# Patient Record
Sex: MALE | Employment: FULL TIME | URBAN - METROPOLITAN AREA
[De-identification: names, ages, dates, MRNs, and addresses within clinical notes are randomized per-mention and may not be internally consistent; named-entity substitution may affect disease eponyms.]

---

## 2017-02-27 ENCOUNTER — OFFICE VISIT (OUTPATIENT)
Dept: FAMILY MEDICINE | Facility: OTHER | Age: 42
End: 2017-02-27
Attending: FAMILY MEDICINE
Payer: MEDICARE

## 2017-02-27 VITALS
DIASTOLIC BLOOD PRESSURE: 78 MMHG | WEIGHT: 189 LBS | TEMPERATURE: 98.5 F | HEART RATE: 89 BPM | BODY MASS INDEX: 26.46 KG/M2 | SYSTOLIC BLOOD PRESSURE: 120 MMHG | HEIGHT: 71 IN | OXYGEN SATURATION: 91 %

## 2017-02-27 DIAGNOSIS — J18.9 PNEUMONIA OF RIGHT LUNG DUE TO INFECTIOUS ORGANISM, UNSPECIFIED PART OF LUNG: Primary | ICD-10-CM

## 2017-02-27 DIAGNOSIS — R05.9 COUGH: ICD-10-CM

## 2017-02-27 LAB
BASOPHILS # BLD AUTO: 0 10E9/L (ref 0–0.2)
BASOPHILS NFR BLD AUTO: 0.2 %
DIFFERENTIAL METHOD BLD: NORMAL
EOSINOPHIL # BLD AUTO: 0.1 10E9/L (ref 0–0.7)
EOSINOPHIL NFR BLD AUTO: 0.6 %
ERYTHROCYTE [DISTWIDTH] IN BLOOD BY AUTOMATED COUNT: 12.5 % (ref 10–15)
HCT VFR BLD AUTO: 43.3 % (ref 40–53)
HGB BLD-MCNC: 14.3 G/DL (ref 13.3–17.7)
LYMPHOCYTES # BLD AUTO: 1.8 10E9/L (ref 0.8–5.3)
LYMPHOCYTES NFR BLD AUTO: 22.3 %
MCH RBC QN AUTO: 29.5 PG (ref 26.5–33)
MCHC RBC AUTO-ENTMCNC: 33 G/DL (ref 31.5–36.5)
MCV RBC AUTO: 90 FL (ref 78–100)
MONOCYTES # BLD AUTO: 0.7 10E9/L (ref 0–1.3)
MONOCYTES NFR BLD AUTO: 8 %
NEUTROPHILS # BLD AUTO: 5.7 10E9/L (ref 1.6–8.3)
NEUTROPHILS NFR BLD AUTO: 68.9 %
PLATELET # BLD AUTO: 343 10E9/L (ref 150–450)
RBC # BLD AUTO: 4.84 10E12/L (ref 4.4–5.9)
WBC # BLD AUTO: 8.3 10E9/L (ref 4–11)

## 2017-02-27 PROCEDURE — 99214 OFFICE O/P EST MOD 30 MIN: CPT | Performed by: FAMILY MEDICINE

## 2017-02-27 PROCEDURE — 96372 THER/PROPH/DIAG INJ SC/IM: CPT | Performed by: FAMILY MEDICINE

## 2017-02-27 PROCEDURE — 85025 COMPLETE CBC W/AUTO DIFF WBC: CPT | Performed by: FAMILY MEDICINE

## 2017-02-27 PROCEDURE — 71020 ZZHC CHEST TWO VIEWS, FRONT/LAT: CPT | Mod: TC

## 2017-02-27 PROCEDURE — 36415 COLL VENOUS BLD VENIPUNCTURE: CPT | Performed by: FAMILY MEDICINE

## 2017-02-27 PROCEDURE — 99212 OFFICE O/P EST SF 10 MIN: CPT | Mod: 25

## 2017-02-27 RX ORDER — CEFTRIAXONE 1 G/1
1000 INJECTION, POWDER, FOR SOLUTION INTRAMUSCULAR; INTRAVENOUS ONCE
Qty: 10 ML | Refills: 0 | OUTPATIENT
Start: 2017-02-27 | End: 2017-02-27

## 2017-02-27 RX ORDER — AZITHROMYCIN 250 MG/1
TABLET, FILM COATED ORAL
Qty: 6 TABLET | Refills: 0 | Status: SHIPPED | OUTPATIENT
Start: 2017-02-27 | End: 2017-03-03

## 2017-02-27 NOTE — NURSING NOTE
"Chief Complaint   Patient presents with     URI     patient has had this for a couple weeks now and tried OTC and symptoms have not improved        Initial /78 (BP Location: Left arm, Cuff Size: Adult Large)  Pulse 89  Temp 98.5  F (36.9  C) (Tympanic)  Ht 5' 11\" (1.803 m)  Wt 189 lb (85.7 kg)  SpO2 91%  BMI 26.36 kg/m2 Estimated body mass index is 26.36 kg/(m^2) as calculated from the following:    Height as of this encounter: 5' 11\" (1.803 m).    Weight as of this encounter: 189 lb (85.7 kg).  Medication Reconciliation: linette James      "

## 2017-02-27 NOTE — PATIENT INSTRUCTIONS
Kavon has pneumonia on the right side.  We gave him an injection of antibiotics today in the clinic, and sent him home with azithromycin (Z-pack).  The combination of the Z-pack and the shot (Rocephin) work well for pneumonia.  You can still use cough medication as needed.  Discharge Instructions for Pneumonia  You have been diagnosed with pneumonia, a serious lung infection. Most cases of pneumonia are caused by bacteria. Pneumonia most often occurs in older adults, young children, and people with chronic health problems.  Home care    Take your medication exactly as directed. Don t skip doses. Continue taking your antibiotics as directed until they are all gone -- even if you start to feel better. This will prevent the pneumonia from coming back.    Drink at least 8 glasses of water daily, unless directed otherwise. This helps to loosen and thin secretions so that you can cough them up.    Use a cool-mist humidifier in your bedroom. Be sure to clean the humidifier daily.    Coughing up mucus is normal. Don t use medications to suppress your cough unless your cough is dry, painful, or interferes with your sleep. You may use an expectorant if ordered by your doctor.    Warm compresses or a heating pad on the lowest setting can be used to relieve chest discomfort. Use several times a day for 15 to 20 minutes at a time. (To prevent injuring your skin, be sure the temperature of the compress or heating pad is warm, not hot.)    Get plenty of rest until your fever, shortness of breath, and chest pain go away.    Plan to get a flu shot every year.    Ask your doctor about pneumonia vaccinations.     Follow-up care  Make a follow-up appointment as directed by our staff.     When to seek medical care  Call 911 right away if you have any of the following:    Chest pain    Trouble breathing    Blue lips or fingernails  Otherwise, call your doctor if you have any of the following:    Fever above 101.5 F  (38.6 C)    Yellow,  green, bloody, or smelly sputum    More than normal mucus production    Vomiting     9800-5779 [employer]. [address] [place] [address], [place] [address]. All rights reserved. This information is not intended as a substitute for professional medical care. Always follow your healthcare professional's instructions.

## 2017-02-27 NOTE — MR AVS SNAPSHOT
After Visit Summary   2/27/2017    Hernando Irizarry    MRN: [id number]           Patient Information     Date Of Birth          1975        Visit Information        Provider Department      2/27/2017 10:00 AM Bobbi Alexandra MD [facility]        Today's Diagnoses     Pneumonia of right lung due to infectious organism, unspecified part of lung    -  1    Cough          Care Instructions    Kavon has pneumonia on the right side.  We gave him an injection of antibiotics today in the clinic, and sent him home with azithromycin (Z-pack).  The combination of the Z-pack and the shot (Rocephin) work well for pneumonia.  You can still use cough medication as needed.  Discharge Instructions for Pneumonia  You have been diagnosed with pneumonia, a serious lung infection. Most cases of pneumonia are caused by bacteria. Pneumonia most often occurs in older adults, young children, and people with chronic health problems.  Home care    Take your medication exactly as directed. Don t skip doses. Continue taking your antibiotics as directed until they are all gone -- even if you start to feel better. This will prevent the pneumonia from coming back.    Drink at least 8 glasses of water daily, unless directed otherwise. This helps to loosen and thin secretions so that you can cough them up.    Use a cool-mist humidifier in your bedroom. Be sure to clean the humidifier daily.    Coughing up mucus is normal. Don t use medications to suppress your cough unless your cough is dry, painful, or interferes with your sleep. You may use an expectorant if ordered by your doctor.    Warm compresses or a heating pad on the lowest setting can be used to relieve chest discomfort. Use several times a day for 15 to 20 minutes at a time. (To prevent injuring your skin, be sure the temperature of the compress or heating pad is warm, not hot.)    Get plenty of rest until your fever, shortness of breath, and chest pain go  "away.    Plan to get a flu shot every year.    Ask your doctor about pneumonia vaccinations.     Follow-up care  Make a follow-up appointment as directed by our staff.     When to seek medical care  Call 911 right away if you have any of the following:    Chest pain    Trouble breathing    Blue lips or fingernails  Otherwise, call your doctor if you have any of the following:    Fever above 101.5 F  (38.6 C)    Yellow, green, bloody, or smelly sputum    More than normal mucus production    Vomiting     4786-5982 [employer]. [address] [place] [address]. All rights reserved. This information is not intended as a substitute for professional medical care. Always follow your healthcare professional's instructions.              Follow-ups after your visit        Follow-up notes from your care team     Return if symptoms worsen or fail to improve.      Who to contact     If you have questions or need follow up information about today's clinic visit or your schedule please contact [facility] directly at [phone].  Normal or non-critical lab and imaging results will be communicated to you by [employer], letter or phone within 4 business days after the clinic has received the results. If you do not hear from us within 7 days, please contact the clinic through [employer] or phone. If you have a critical or abnormal lab result, we will notify you by phone as soon as possible.  Submit refill requests through [employer] or call your pharmacy and they will forward the refill request to us. Please allow 3 business days for your refill to be completed.          Additional Information About Your Visit        [employer] Information     [employer] lets you send messages to your doctor, view your test results, renew your prescriptions, schedule appointments and more. To sign up, go to [email or web address] . Click on \"Log in\" on the left side of the screen, which will take you to the Welcome page. Then " "click on \"Sign up Now\" on the right side of the page.     You will be asked to enter the access code listed below, as well as some personal information. Please follow the directions to create your username and password.     Your access code is: [email or web address]  Expires: 2017 10:07 AM     Your access code will  in 90 days. If you need help or a new code, please call your [facility] clinic or [phone].        Care EveryWhere ID     This is your Care EveryWhere ID. This could be used by other organizations to access your [facility] medical records  [id number]        Your Vitals Were     Pulse Temperature Height Pulse Oximetry BMI (Body Mass Index)       89 98.5  F (36.9  C) (Tympanic) 5' 11\" (1.803 m) 91% 26.36 kg/m2        Blood Pressure from Last 3 Encounters:   17 120/78   09/18/15 138/84   14 124/72    Weight from Last 3 Encounters:   17 189 lb (85.7 kg)   09/18/15 195 lb (88.5 kg)   14 188 lb (85.3 kg)              We Performed the Following     CBC with platelets and differential     CEFTRIAXONE NA INJ /250MG     INJECTION INTRAMUSCULAR OR SUB-Q     XR CHEST 2 VW (Clinic Performed)          Today's Medication Changes          These changes are accurate as of: 17 12:52 PM.  If you have any questions, ask your nurse or doctor.               Start taking these medicines.        Dose/Directions    azithromycin 250 MG tablet   Commonly known as:  ZITHROMAX   Used for:  Pneumonia of right lung due to infectious organism, unspecified part of lung   Started by:  Bobbi Alexandra MD        Two tablets first day, then one tablet daily for four days.   Quantity:  6 tablet   Refills:  0       cefTRIAXone 1 GM vial   Commonly known as:  ROCEPHIN   Used for:  Pneumonia of right lung due to infectious organism, unspecified part of lung   Started by:  Bobbi Alexandra MD        Dose:  1000 mg   Inject 1 g (1,000 mg) into the muscle once for 1 dose   Quantity:  10 mL   Refills:  0    "         Where to get your medicines      These medications were sent to [id number] - [place], [place] - [address]  [address] [facility] [address]  [address] [place] [address]     Phone:  [phone]     azithromycin 250 MG tablet         Some of these will need a paper prescription and others can be bought over the counter.  Ask your nurse if you have questions.     You don't need a prescription for these medications     cefTRIAXone 1 GM vial                Primary Care Provider    None       No address on file        Thank you!     Thank you for choosing [facility]  for your care. Our goal is always to provide you with excellent care. Hearing back from our patients is one way we can continue to improve our services. Please take a few minutes to complete the written survey that you may receive in the mail after your visit with us. Thank you!             Your Updated Medication List - Protect others around you: Learn how to safely use, store and throw away your medicines at www.disposemymeds.org.          This list is accurate as of: 2/27/17 12:52 PM.  Always use your most recent med list.                   Brand Name Dispense Instructions for use    azithromycin 250 MG tablet    ZITHROMAX    6 tablet    Two tablets first day, then one tablet daily for four days.       cefTRIAXone 1 GM vial    ROCEPHIN    10 mL    Inject 1 g (1,000 mg) into the muscle once for 1 dose       cetirizine 10 MG tablet    zyrTEC    30 tablet    Take 1 tablet (10 mg) by mouth every evening       fluticasone 50 MCG/ACT spray    FLONASE    1 Package    Spray 2 sprays into both nostrils daily       guaiFENesin-dextromethorphan 100-10 MG/5ML syrup    ROBITUSSIN DM    118 mL    Take 5 mLs by mouth every 4 hours as needed for cough       ibuprofen 800 MG tablet    ADVIL/MOTRIN    60 tablet    Take 1 tablet (800 mg) by mouth every 8 hours as needed for moderate pain       phenol 1.4 % Liqd spray     CHLORASEPTIC    118 mL    Take 1 spray (1 mL) by mouth every hour as needed for sore throat       pseudoePHEDrine 120 MG 12 hr tablet    SUDAFED    28 tablet    Take 1 tablet (120 mg) by mouth every 12 hours

## 2017-02-27 NOTE — PROGRESS NOTES
SUBJECTIVE:                                                    Hernando Irizarry is a 41 year old male who presents to clinic today for the following health issues:    Acute Illness   Acute illness concerns: cough  Onset: over 2 weeks, worsening    Fever: no    Chills/Sweats: YES    Headache (location?): no    Sinus Pressure:no    Conjunctivitis:  no    Ear Pain: no    Rhinorrhea: YES    Congestion: YES    Sore Throat: no     Cough: YES-non-productive    Wheeze: no    Decreased Appetite: no    Nausea: no    Vomiting: no    Diarrhea:  no    Dysuria/Freq.: no    Fatigue/Achiness: no    Sick/Strep Exposure: no     Therapies Tried and outcome: Cough medication, not helpful        Problem list and histories reviewed & adjusted, as indicated.  Additional history: as documented    There is no problem list on file for this patient.    No past surgical history on file.    Social History   Substance Use Topics     Smoking status: Never Smoker     Smokeless tobacco: Never Used     Alcohol use No     No family history on file.      Current Outpatient Prescriptions   Medication Sig Dispense Refill     cefTRIAXone (ROCEPHIN) 1 GM vial Inject 1 g (1,000 mg) into the muscle once for 1 dose 10 mL 0     azithromycin (ZITHROMAX) 250 MG tablet Two tablets first day, then one tablet daily for four days. 6 tablet 0     guaiFENesin-dextromethorphan (ROBITUSSIN DM) 100-10 MG/5ML syrup Take 5 mLs by mouth every 4 hours as needed for cough 118 mL 0     pseudoePHEDrine (SUDAFED) 120 MG 12 hr tablet Take 1 tablet (120 mg) by mouth every 12 hours 28 tablet 0     phenol (CHLORASEPTIC) 1.4 % LIQD Take 1 spray (1 mL) by mouth every hour as needed for sore throat 118 mL 0     ibuprofen (ADVIL,MOTRIN) 800 MG tablet Take 1 tablet (800 mg) by mouth every 8 hours as needed for moderate pain 60 tablet 1     cetirizine (ZYRTEC) 10 MG tablet Take 1 tablet (10 mg) by mouth every evening 30 tablet 1     fluticasone (FLONASE) 50 MCG/ACT nasal spray Spray 2  "sprays into both nostrils daily 1 Package 0     Allergies   Allergen Reactions     Bee Venom      Honey Bee Venom Protein (Honey Bee).       ROS:  Constitutional, HEENT, cardiovascular, pulmonary, gi and gu systems are negative, except as otherwise noted.    OBJECTIVE:                                                    /78 (BP Location: Left arm, Cuff Size: Adult Large)  Pulse 89  Temp 98.5  F (36.9  C) (Tympanic)  Ht 5' 11\" (1.803 m)  Wt 189 lb (85.7 kg)  SpO2 91%  BMI 26.36 kg/m2  Body mass index is 26.36 kg/(m^2).  GENERAL: alert and no distress  EYES: Eyes grossly normal to inspection, PERRL and conjunctivae and sclerae normal  HENT: ear canals and TM's normal, nose and mouth without ulcers or lesions  NECK: no adenopathy  RESP: decreased breath sounds R lower posterior  CV: regular rate and rhythm, normal S1 S2, no S3 or S4, no murmur, click or rub, no peripheral edema and peripheral pulses strong    Diagnostic Test Results:  Results for orders placed or performed in visit on 02/27/17 (from the past 24 hour(s))   CBC with platelets and differential   Result Value Ref Range    WBC 8.3 4.0 - 11.0 10e9/L    RBC Count 4.84 4.4 - 5.9 10e12/L    Hemoglobin 14.3 13.3 - 17.7 g/dL    Hematocrit 43.3 40.0 - 53.0 %    MCV 90 78 - 100 fl    MCH 29.5 26.5 - 33.0 pg    MCHC 33.0 31.5 - 36.5 g/dL    RDW 12.5 10.0 - 15.0 %    Platelet Count 343 150 - 450 10e9/L    Diff Method Automated Method     % Neutrophils 68.9 %    % Lymphocytes 22.3 %    % Monocytes 8.0 %    % Eosinophils 0.6 %    % Basophils 0.2 %    Absolute Neutrophil 5.7 1.6 - 8.3 10e9/L    Absolute Lymphocytes 1.8 0.8 - 5.3 10e9/L    Absolute Monocytes 0.7 0.0 - 1.3 10e9/L    Absolute Eosinophils 0.1 0.0 - 0.7 10e9/L    Absolute Basophils 0.0 0.0 - 0.2 10e9/L   XR CHEST 2 VW (Clinic Performed)    Narrative    CHEST FRONTAL AND LATERAL VIEWS    HISTORY:  Cough.    COMPARISON:  None.    FINDINGS:  Frontal and lateral views of the chest were obtained.  " The  cardiac silhouette is normal in size.  The pulmonary vasculature is  normal.  There are airspace opacities in the right middle and lower  lobes.  The left lung is clear.  No pneumothorax or pleural  effusions.    IMPRESSION:  RIGHT MIDDLE AND LOWER LOBE INFILTRATE.  RECOMMEND FOLLOW  UP TO ENSURE RESOLUTION.  Exam Date: Feb 27, 2017 09:52:00 AM  Author: ERMA RHODES  This report is preliminary and null       CXR - as above     ASSESSMENT/PLAN:                                                      1. Pneumonia of right lung due to infectious organism, unspecified part of lung  Rocephin given in office and azithromycin prescribed.  Continue cough medicine as needed.  Follow-up if no improvement noted  - CEFTRIAXONE NA INJ /250MG  - INJECTION INTRAMUSCULAR OR SUB-Q  - cefTRIAXone (ROCEPHIN) 1 GM vial; Inject 1 g (1,000 mg) into the muscle once for 1 dose  Dispense: 10 mL; Refill: 0  - azithromycin (ZITHROMAX) 250 MG tablet; Two tablets first day, then one tablet daily for four days.  Dispense: 6 tablet; Refill: 0    2. Cough  - CBC with platelets and differential  - XR CHEST 2 VW (Clinic Performed)        Bobbi Alexandra MD  [facility]

## 2017-02-27 NOTE — PROGRESS NOTES
The following medication was given:     MEDICATION: Rocephin 1g and Lidocaine 2.1cc  ROUTE: IM  SITE: [facility]  DOSE: 1g  LOT #: [id number]  :  Elia  EXPIRATION DATE:  05/31/2019  NDC#: 7248-8472-12    Annette Oliver

## 2017-03-09 ENCOUNTER — OFFICE VISIT (OUTPATIENT)
Dept: FAMILY MEDICINE | Facility: OTHER | Age: 42
End: 2017-03-09
Attending: PHYSICIAN ASSISTANT
Payer: MEDICARE

## 2017-03-09 ENCOUNTER — TELEPHONE (OUTPATIENT)
Dept: FAMILY MEDICINE | Facility: OTHER | Age: 42
End: 2017-03-09

## 2017-03-09 VITALS
SYSTOLIC BLOOD PRESSURE: 120 MMHG | HEIGHT: 72 IN | HEART RATE: 92 BPM | WEIGHT: 189 LBS | DIASTOLIC BLOOD PRESSURE: 82 MMHG | BODY MASS INDEX: 25.6 KG/M2 | RESPIRATION RATE: 14 BRPM

## 2017-03-09 DIAGNOSIS — Z02.89 HEALTH EXAMINATION OF DEFINED SUBPOPULATION: Primary | ICD-10-CM

## 2017-03-09 DIAGNOSIS — J18.9 PNEUMONIA OF RIGHT MIDDLE LOBE DUE TO INFECTIOUS ORGANISM: ICD-10-CM

## 2017-03-09 DIAGNOSIS — S93.422A SPRAIN OF DELTOID LIGAMENT OF LEFT ANKLE, INITIAL ENCOUNTER: ICD-10-CM

## 2017-03-09 PROCEDURE — 99395 PREV VISIT EST AGE 18-39: CPT | Performed by: PHYSICIAN ASSISTANT

## 2017-03-09 NOTE — MR AVS SNAPSHOT
"              After Visit Summary   3/9/2017    Hernando Irizarry    MRN: [id number]           Patient Information     Date Of Birth          1975        Visit Information        Provider Department      3/9/2017 2:30 PM Rosana Darden, PA [facility]        Today's Diagnoses     Health examination of defined subpopulation    -  1    Pneumonia of right middle lobe due to infectious organism        Sprain of deltoid ligament of left ankle, initial encounter          Care Instructions    Thank you for choosing [facility].   I appreciate the opportunity to serve you and look forward to supporting your healthcare needs in the future. Please contact me with any questions or concerns that you may have.    Sincerely,    Rosana Darden RN, PA-C           Follow-ups after your visit        Follow-up notes from your care team     Return in about 4 weeks (around 4/6/2017) for dr perez for cxr with his pneumonia. .      Who to contact     If you have questions or need follow up information about today's clinic visit or your schedule please contact [facility] directly at [phone].  Normal or non-critical lab and imaging results will be communicated to you by MyChart, letter or phone within 4 business days after the clinic has received the results. If you do not hear from us within 7 days, please contact the clinic through MyChart or phone. If you have a critical or abnormal lab result, we will notify you by phone as soon as possible.  Submit refill requests through [employer] or call your pharmacy and they will forward the refill request to us. Please allow 3 business days for your refill to be completed.          Additional Information About Your Visit        [employer] Information     [email or web address] lets you send messages to your doctor, view your test results, renew your prescriptions, schedule appointments and more. To sign up, go to [email or web address] . Click on \"Log in\" on the " "left side of the screen, which will take you to the Welcome page. Then click on \"Sign up Now\" on the right side of the page.     You will be asked to enter the access code listed below, as well as some personal information. Please follow the directions to create your username and password.     Your access code is: [email or web address]  Expires: 2017 10:07 AM     Your access code will  in 90 days. If you need help or a new code, please call your [place] clinic or [phone].        Care EveryWhere ID     This is your Care EveryWhere ID. This could be used by other organizations to access your [place] medical records  [id number]        Your Vitals Were     Pulse Respirations Height BMI (Body Mass Index)          92 14 6' (1.829 m) 25.63 kg/m2         Blood Pressure from Last 3 Encounters:   17 120/82   17 120/78   09/18/15 138/84    Weight from Last 3 Encounters:   17 189 lb (85.7 kg)   17 189 lb (85.7 kg)   09/18/15 195 lb (88.5 kg)              Today, you had the following     No orders found for display         Today's Medication Changes          These changes are accurate as of: 3/9/17  2:55 PM.  If you have any questions, ask your nurse or doctor.               Start taking these medicines.        Dose/Directions    order for DME   Used for:  Sprain of deltoid ligament of left ankle, initial encounter   Started by:  Rosana Darden PA        Equipment being ordered: aso wrap   Quantity:  1 each   Refills:  0         Stop taking these medicines if you haven't already. Please contact your care team if you have questions.     cetirizine 10 MG tablet   Commonly known as:  zyrTEC   Stopped by:  Rosana Darden PA           fluticasone 50 MCG/ACT spray   Commonly known as:  FLONASE   Stopped by:  Rosana Darden PA           fluticasone-salmeterol 250-50 MCG/DOSE diskus inhaler   Commonly known as:  ADVAIR   Stopped by:  Rosana Darden PA           " guaiFENesin-dextromethorphan 100-10 MG/5ML syrup   Commonly known as:  ROBITUSSIN DM   Stopped by:  Rosana Darden PA           ibuprofen 800 MG tablet   Commonly known as:  ADVIL/MOTRIN   Stopped by:  Rosana Darden PA           phenol 1.4 % Liqd spray   Commonly known as:  CHLORASEPTIC   Stopped by:  Rosana Darden PA           pseudoePHEDrine 120 MG 12 hr tablet   Commonly known as:  SUDAFED   Stopped by:  Rosana Darden PA                Where to get your medicines      Some of these will need a paper prescription and others can be bought over the counter.  Ask your nurse if you have questions.     Bring a paper prescription for each of these medications     order for DME                Primary Care Provider    None       No address on file        Thank you!     Thank you for choosing [facility]  for your care. Our goal is always to provide you with excellent care. Hearing back from our patients is one way we can continue to improve our services. Please take a few minutes to complete the written survey that you may receive in the mail after your visit with us. Thank you!             Your Updated Medication List - Protect others around you: Learn how to safely use, store and throw away your medicines at www.disposemymeds.org.          This list is accurate as of: 3/9/17  2:55 PM.  Always use your most recent med list.                   Brand Name Dispense Instructions for use    order for DME     1 each    Equipment being ordered: aso wrap

## 2017-03-09 NOTE — NURSING NOTE
Chief Complaint   Patient presents with     Physical     [employer] physical, would liketo have albuterol prescribed again     Fall     slipped on ice, left ankle injury, 3-2-17       Initial /82 (BP Location: Right arm, Patient Position: Chair, Cuff Size: Adult Large)  Pulse 92  Resp 14  Ht 6' (1.829 m)  Wt 189 lb (85.7 kg)  BMI 25.63 kg/m2 Estimated body mass index is 25.63 kg/(m^2) as calculated from the following:    Height as of this encounter: 6' (1.829 m).    Weight as of this encounter: 189 lb (85.7 kg).  Medication Reconciliation: complete     Rachelle Leong

## 2017-03-09 NOTE — PATIENT INSTRUCTIONS
Thank you for choosing [facility].   I appreciate the opportunity to serve you and look forward to supporting your healthcare needs in the future. Please contact me with any questions or concerns that you may have.    Sincerely,    Rosana Darden RN, PA-C

## 2017-03-09 NOTE — TELEPHONE ENCOUNTER
7:36 AM    Reason for Call: OVERBOOK    Patient is having the following symptoms: Needs physical for [employer] by next Thursday      The patient is requesting an appointment  With   Dr. Perez    Was an appointment offered for this call? No    Preferred method for responding to this message: [phone]  Rosanne Burgos    If we cannot reach you directly, may we leave a detailed response at the number you provided? Yes      Yasmine Corrigan

## 2017-03-09 NOTE — PROGRESS NOTES
SUBJECTIVE: Hernando Irizarry  is here today because of:Need for [employer] physical.   Follow up on pneumonia    History reviewed. No pertinent past medical history.  History reviewed. No pertinent past surgical history.  No current outpatient prescriptions on file.      Allergies   Allergen Reactions     Bee Venom      Honey Bee Venom Protein (Honey Bee).       Family and Social History are reviewed.    Review Of Systems  Skin: negative  Eyes: negative  Ears/Nose/Throat: negative  Respiratory: active pneumonia  Cardiovascular: negative  Gastrointestinal: negative  Musculoskeletal: negative  Neurologic: negative  Psychiatric: negative     OBJECTIVE:   Vital signs:/82 (BP Location: Right arm, Patient Position: Chair, Cuff Size: Adult Large)  Pulse 92  Resp 14  Ht 6' (1.829 m)  Wt 189 lb (85.7 kg)  BMI 25.63 kg/m2   General: healthy, alert and no distress  Constitutional: healthy, alert and no distress  Head: Normocephalic. No masses, lesions, tenderness or abnormalities  Neck: Neck supple. No adenopathy. Thyroid symmetric, normal size,, Carotids without bruits.  ENT: ENT exam normal, no neck nodes or sinus tenderness  Cardiovascular: negative, PMI normal. No lifts, heaves, or thrills. RRR. No murmurs, clicks gallops or rub  Respiratory: negative, Percussion normal. Good diaphragmatic excursion. Lungs clear  Gastrointestinal: Abdomen soft, non-tender. BS normal. No masses, organomegaly  : Deferred  Musculoskeletal: normal muscle tone.  Good range of motion.  Bruise medially on his left ankle on the deltoid ligament.   Skin: no suspicious lesions or rashes  Neurologic: Gait normal. Reflexes normal and symmetric. Sensation grossly WNL.  Psychiatric: mentation appears normal and affect normal/bright  Hematologic/Lymphatic/Immunologic: normal ant/post cervical, axillary, supraclavicular and inguinal nodes      ASSESSMENT:   1) 1. Health examination of defined subpopulation  He is given clearance for special  Olympics.     2. Pneumonia of right middle lobe due to infectious organism  He is doing much better.  Still SOB and  Getting albuterol from his mom and seems to help him.     3. Sprain of deltoid ligament of left ankle, initial encounter  New for him.  Very mild.  Reassured.   icing and use of ace unfortunately as a tourniquet.   - order for DME; Equipment being ordered: aso wrap  Dispense: 1 each; Refill: 0      PLAN:  He is cleared.

## 2017-03-21 ENCOUNTER — TELEPHONE (OUTPATIENT)
Dept: FAMILY MEDICINE | Facility: OTHER | Age: 42
End: 2017-03-21

## 2017-03-21 NOTE — LETTER
[facility]  [address]  [facility] [address]  Phone: [phone]    March 21, 2017        Hernando Irizarry  [address] [address]          To whom it may concern:    RE: Hernando Irizarry    Patient may return to playing basketball at this time.    Please contact me for questions or concerns.      Sincerely,        Bobbi Alexandra MD

## 2017-03-21 NOTE — TELEPHONE ENCOUNTER
Pt called again to have the same note faxed to [phone] that allows him to play basketball.  Pt was notified that Rosana Darden is out today and that a returned call would be made tomorrow when she returns, if applicable. Nurse please advise.

## 2017-03-21 NOTE — TELEPHONE ENCOUNTER
Pt called stating that he was looking for a notes stating that he is ok to play basketball. Pt stated that Rosana Darden(visit 3/9) told him not to play basketball due to his SOB from pneumonia. Please fax to ROBERTA [phone]

## 2017-03-27 ENCOUNTER — OFFICE VISIT (OUTPATIENT)
Dept: FAMILY MEDICINE | Facility: OTHER | Age: 42
End: 2017-03-27
Attending: FAMILY MEDICINE
Payer: MEDICARE

## 2017-03-27 VITALS
WEIGHT: 191 LBS | DIASTOLIC BLOOD PRESSURE: 64 MMHG | OXYGEN SATURATION: 98 % | SYSTOLIC BLOOD PRESSURE: 128 MMHG | RESPIRATION RATE: 14 BRPM | TEMPERATURE: 97.4 F | HEART RATE: 84 BPM | HEIGHT: 72 IN | BODY MASS INDEX: 25.87 KG/M2

## 2017-03-27 DIAGNOSIS — J30.9 ALLERGIC RHINITIS, UNSPECIFIED: ICD-10-CM

## 2017-03-27 DIAGNOSIS — J18.9 PNEUMONIA OF RIGHT LUNG DUE TO INFECTIOUS ORGANISM, UNSPECIFIED PART OF LUNG: Primary | ICD-10-CM

## 2017-03-27 PROCEDURE — 95117 IMMUNOTHERAPY INJECTIONS: CPT | Performed by: FAMILY MEDICINE

## 2017-03-27 PROCEDURE — 71020 ZZHC CHEST TWO VIEWS, FRONT/LAT: CPT | Mod: TC

## 2017-03-27 PROCEDURE — 99213 OFFICE O/P EST LOW 20 MIN: CPT | Performed by: FAMILY MEDICINE

## 2017-03-27 PROCEDURE — 99212 OFFICE O/P EST SF 10 MIN: CPT | Mod: 25 | Performed by: FAMILY MEDICINE

## 2017-03-27 NOTE — PROGRESS NOTES
SUBJECTIVE:                                                    Hernando Irizarry is a 41 year old male who presents to clinic today for the following health issues:    Medication Followup of Acute Pneumonia    Taking Medication as prescribed: yes, completed course    Side Effects:  None    Medication Helping Symptoms:  Yes - symptoms resolved.    Patient notes cough is gone.  Repeat CXR was recommended to ensure resolution of infection.         Problem list and histories reviewed & adjusted, as indicated.  Additional history: as documented    Patient Active Problem List   Diagnosis     MR (mental retardation)     No past surgical history on file.    Social History   Substance Use Topics     Smoking status: Never Smoker     Smokeless tobacco: Never Used     Alcohol use No     No family history on file.      Current Outpatient Prescriptions   Medication Sig Dispense Refill     order for DME Equipment being ordered: aso wrap 1 each 0     Allergies   Allergen Reactions     Bee Venom      Honey Bee Venom Protein (Honey Bee).       Reviewed and updated as needed this visit by clinical staff  Tobacco  Allergies  Meds       Reviewed and updated as needed this visit by Provider         ROS:  Constitutional, HEENT, cardiovascular, pulmonary, gi and gu systems are negative, except as otherwise noted.    OBJECTIVE:                                                    /64 (BP Location: Left arm, Patient Position: Chair, Cuff Size: Adult Regular)  Pulse 84  Temp 97.4  F (36.3  C) (Tympanic)  Resp 14  Ht 6' (1.829 m)  Wt 191 lb (86.6 kg)  SpO2 98%  BMI 25.9 kg/m2  Body mass index is 25.9 kg/(m^2).  GENERAL: healthy, alert and no distress  PSYCH: mentation appears normal, affect normal/bright    Diagnostic Test Results:  CXR - improved when compared to previous     ASSESSMENT/PLAN:                                                      1. Pneumonia of right lung due to infectious organism, unspecified part of lung  No  further treatments needed.  Follow-up prn  - XR CHEST 2 VW (Clinic Performed)    2. Allergic rhinitis, unspecified  - Allergy Shot: Two or more injections        Bobbi Alexandra MD  [facility]

## 2017-03-27 NOTE — MR AVS SNAPSHOT
"              After Visit Summary   3/27/2017    Hernando Irizarry    MRN: [id number]           Patient Information     Date Of Birth          1975        Visit Information        Provider Department      3/27/2017 4:15 PM Bobbi Alexandra MD [facility]        Today's Diagnoses     Pneumonia of right lung due to infectious organism, unspecified part of lung    -  1    Allergic rhinitis, unspecified           Follow-ups after your visit        Follow-up notes from your care team     Return if symptoms worsen or fail to improve.      Who to contact     If you have questions or need follow up information about today's clinic visit or your schedule please contact [facility] directly at [phone].  Normal or non-critical lab and imaging results will be communicated to you by MyChart, letter or phone within 4 business days after the clinic has received the results. If you do not hear from us within 7 days, please contact the clinic through MyChart or phone. If you have a critical or abnormal lab result, we will notify you by phone as soon as possible.  Submit refill requests through [employer] or call your pharmacy and they will forward the refill request to us. Please allow 3 business days for your refill to be completed.          Additional Information About Your Visit        MyChart Information     [employer] lets you send messages to your doctor, view your test results, renew your prescriptions, schedule appointments and more. To sign up, go to [email or web address] . Click on \"Log in\" on the left side of the screen, which will take you to the Welcome page. Then click on \"Sign up Now\" on the right side of the page.     You will be asked to enter the access code listed below, as well as some personal information. Please follow the directions to create your username and password.     Your access code is: [email or web address]  Expires: 2017 11:07 AM     Your access code will  in 90 " days. If you need help or a new code, please call your [place] clinic or [phone].        Care EveryWhere ID     This is your Care EveryWhere ID. This could be used by other organizations to access your [place] medical records  [id number]        Your Vitals Were     Pulse Temperature Respirations Height Pulse Oximetry BMI (Body Mass Index)    84 97.4  F (36.3  C) (Tympanic) 14 6' (1.829 m) 98% 25.9 kg/m2       Blood Pressure from Last 3 Encounters:   03/27/17 128/64   03/09/17 120/82   02/27/17 120/78    Weight from Last 3 Encounters:   03/27/17 191 lb (86.6 kg)   03/09/17 189 lb (85.7 kg)   02/27/17 189 lb (85.7 kg)              We Performed the Following     Allergy Shot: Two or more injections     XR CHEST 2 VW (Clinic Performed)        Primary Care Provider    None       No address on file        Thank you!     Thank you for choosing [facility]  for your care. Our goal is always to provide you with excellent care. Hearing back from our patients is one way we can continue to improve our services. Please take a few minutes to complete the written survey that you may receive in the mail after your visit with us. Thank you!             Your Updated Medication List - Protect others around you: Learn how to safely use, store and throw away your medicines at www.disposemymeds.org.          This list is accurate as of: 3/27/17  4:45 PM.  Always use your most recent med list.                   Brand Name Dispense Instructions for use    order for DME     1 each    Equipment being ordered: aso wrap

## 2017-03-27 NOTE — NURSING NOTE
Chief Complaint   Patient presents with     RECHECK     Patient is following up with pneumonia.       Initial /64 (BP Location: Left arm, Patient Position: Chair, Cuff Size: Adult Regular)  Pulse 84  Temp 97.4  F (36.3  C) (Tympanic)  Resp 14  Ht 6' (1.829 m)  Wt 191 lb (86.6 kg)  SpO2 98%  BMI 25.9 kg/m2 Estimated body mass index is 25.9 kg/(m^2) as calculated from the following:    Height as of this encounter: 6' (1.829 m).    Weight as of this encounter: 191 lb (86.6 kg).  Medication Reconciliation: complete   Daysi Galindo

## 2017-04-06 ENCOUNTER — OFFICE VISIT (OUTPATIENT)
Dept: FAMILY MEDICINE | Facility: OTHER | Age: 42
End: 2017-04-06
Attending: FAMILY MEDICINE
Payer: MEDICARE

## 2017-04-06 ENCOUNTER — TRANSFERRED RECORDS (OUTPATIENT)
Dept: HEALTH INFORMATION MANAGEMENT | Facility: HOSPITAL | Age: 42
End: 2017-04-06

## 2017-04-06 ENCOUNTER — HOSPITAL ENCOUNTER (OUTPATIENT)
Dept: ULTRASOUND IMAGING | Facility: HOSPITAL | Age: 42
Discharge: HOME | End: 2017-04-06
Attending: FAMILY MEDICINE | Admitting: FAMILY MEDICINE
Payer: MEDICARE

## 2017-04-06 ENCOUNTER — HOSPITAL ENCOUNTER (EMERGENCY)
Facility: HOSPITAL | Age: 42
Discharge: ANOTHER HOSPITAL | End: 2017-04-06
Attending: PHYSICIAN ASSISTANT | Admitting: PHYSICIAN ASSISTANT
Payer: MEDICARE

## 2017-04-06 VITALS
SYSTOLIC BLOOD PRESSURE: 124 MMHG | DIASTOLIC BLOOD PRESSURE: 76 MMHG | HEART RATE: 60 BPM | RESPIRATION RATE: 14 BRPM | BODY MASS INDEX: 26.14 KG/M2 | WEIGHT: 193 LBS | TEMPERATURE: 97.4 F | HEIGHT: 72 IN

## 2017-04-06 VITALS
DIASTOLIC BLOOD PRESSURE: 99 MMHG | RESPIRATION RATE: 18 BRPM | WEIGHT: 190 LBS | HEIGHT: 73 IN | OXYGEN SATURATION: 98 % | TEMPERATURE: 97.8 F | SYSTOLIC BLOOD PRESSURE: 161 MMHG | BODY MASS INDEX: 25.18 KG/M2

## 2017-04-06 DIAGNOSIS — I82.402 LEG DVT (DEEP VENOUS THROMBOEMBOLISM), ACUTE, LEFT (H): ICD-10-CM

## 2017-04-06 DIAGNOSIS — M25.572 LEFT ANKLE PAIN, UNSPECIFIED CHRONICITY: Primary | ICD-10-CM

## 2017-04-06 DIAGNOSIS — M79.89 LEFT LEG SWELLING: ICD-10-CM

## 2017-04-06 DIAGNOSIS — I82.402 ACUTE DEEP VEIN THROMBOSIS (DVT) OF LEFT LOWER EXTREMITY, UNSPECIFIED VEIN (H): ICD-10-CM

## 2017-04-06 LAB
ANION GAP SERPL CALCULATED.3IONS-SCNC: 7 MMOL/L (ref 3–14)
BASOPHILS # BLD AUTO: 0.1 10E9/L (ref 0–0.2)
BASOPHILS NFR BLD AUTO: 0.5 %
BUN SERPL-MCNC: 9 MG/DL (ref 7–30)
CALCIUM SERPL-MCNC: 9.1 MG/DL (ref 8.5–10.1)
CHLORIDE SERPL-SCNC: 105 MMOL/L (ref 94–109)
CO2 SERPL-SCNC: 28 MMOL/L (ref 20–32)
CREAT SERPL-MCNC: 0.72 MG/DL (ref 0.66–1.25)
DIFFERENTIAL METHOD BLD: NORMAL
EOSINOPHIL # BLD AUTO: 0.1 10E9/L (ref 0–0.7)
EOSINOPHIL NFR BLD AUTO: 0.9 %
ERYTHROCYTE [DISTWIDTH] IN BLOOD BY AUTOMATED COUNT: 14.4 % (ref 10–15)
GFR SERPL CREATININE-BSD FRML MDRD: ABNORMAL ML/MIN/1.7M2
GLUCOSE SERPL-MCNC: 102 MG/DL (ref 70–99)
HCT VFR BLD AUTO: 43.6 % (ref 40–53)
HGB BLD-MCNC: 14.5 G/DL (ref 13.3–17.7)
IMM GRANULOCYTES # BLD: 0.1 10E9/L (ref 0–0.4)
IMM GRANULOCYTES NFR BLD: 0.5 %
LYMPHOCYTES # BLD AUTO: 3.6 10E9/L (ref 0.8–5.3)
LYMPHOCYTES NFR BLD AUTO: 33.9 %
MCH RBC QN AUTO: 29.7 PG (ref 26.5–33)
MCHC RBC AUTO-ENTMCNC: 33.3 G/DL (ref 31.5–36.5)
MCV RBC AUTO: 89 FL (ref 78–100)
MONOCYTES # BLD AUTO: 0.8 10E9/L (ref 0–1.3)
MONOCYTES NFR BLD AUTO: 7.4 %
NEUTROPHILS # BLD AUTO: 6 10E9/L (ref 1.6–8.3)
NEUTROPHILS NFR BLD AUTO: 56.8 %
NRBC # BLD AUTO: 0 10*3/UL
NRBC BLD AUTO-RTO: 0 /100
PLATELET # BLD AUTO: 330 10E9/L (ref 150–450)
POTASSIUM SERPL-SCNC: 3.8 MMOL/L (ref 3.4–5.3)
RBC # BLD AUTO: 4.88 10E12/L (ref 4.4–5.9)
SODIUM SERPL-SCNC: 140 MMOL/L (ref 133–144)
WBC # BLD AUTO: 10.5 10E9/L (ref 4–11)

## 2017-04-06 PROCEDURE — 25000128 H RX IP 250 OP 636: Performed by: PHYSICIAN ASSISTANT

## 2017-04-06 PROCEDURE — 85025 COMPLETE CBC W/AUTO DIFF WBC: CPT | Performed by: PHYSICIAN ASSISTANT

## 2017-04-06 PROCEDURE — 99284 EMERGENCY DEPT VISIT MOD MDM: CPT | Performed by: PHYSICIAN ASSISTANT

## 2017-04-06 PROCEDURE — 80048 BASIC METABOLIC PNL TOTAL CA: CPT | Performed by: PHYSICIAN ASSISTANT

## 2017-04-06 PROCEDURE — 93005 ELECTROCARDIOGRAM TRACING: CPT

## 2017-04-06 PROCEDURE — 99214 OFFICE O/P EST MOD 30 MIN: CPT | Performed by: FAMILY MEDICINE

## 2017-04-06 PROCEDURE — 36415 COLL VENOUS BLD VENIPUNCTURE: CPT | Performed by: PHYSICIAN ASSISTANT

## 2017-04-06 PROCEDURE — 99285 EMERGENCY DEPT VISIT HI MDM: CPT | Mod: 25

## 2017-04-06 PROCEDURE — 96374 THER/PROPH/DIAG INJ IV PUSH: CPT

## 2017-04-06 RX ORDER — HEPARIN SODIUM 10000 [USP'U]/100ML
0-3500 INJECTION, SOLUTION INTRAVENOUS CONTINUOUS
Status: DISCONTINUED | OUTPATIENT
Start: 2017-04-06 | End: 2017-04-06 | Stop reason: HOSPADM

## 2017-04-06 RX ADMIN — HEPARIN SODIUM 6900 UNITS: 1000 INJECTION, SOLUTION INTRAVENOUS; SUBCUTANEOUS at 17:10

## 2017-04-06 ASSESSMENT — ENCOUNTER SYMPTOMS
SHORTNESS OF BREATH: 0
FEVER: 0
ABDOMINAL PAIN: 0
HEADACHES: 0

## 2017-04-06 NOTE — NURSING NOTE
Chief Complaint   Patient presents with     Leg Swelling     Left lower leg swelling, had been wearing a brace until yesterday. Denies pain.       Initial /76 (BP Location: Left arm, Patient Position: Chair, Cuff Size: Adult Large)  Pulse 60  Temp 97.4  F (36.3  C) (Tympanic)  Resp 14  Ht 6' (1.829 m)  Wt 193 lb (87.5 kg)  BMI 26.18 kg/m2 Estimated body mass index is 26.18 kg/(m^2) as calculated from the following:    Height as of this encounter: 6' (1.829 m).    Weight as of this encounter: 193 lb (87.5 kg).  Medication Reconciliation: complete   Daysi Galindo

## 2017-04-06 NOTE — MR AVS SNAPSHOT
"              After Visit Summary   2017    Hernando Irizarry    MRN: [id number]           Patient Information     Date Of Birth          1975        Visit Information        Provider Department      2017 2:00 PM Bhargavi Adam MD [facility]        Today's Diagnoses     Left ankle pain, unspecified chronicity    -  1    Acute deep vein thrombosis (DVT) of left lower extremity, unspecified vein (H)        Left leg swelling          Care Instructions    To ER        Follow-ups after your visit        Who to contact     If you have questions or need follow up information about today's clinic visit or your schedule please contact [facility] directly at [phone].  Normal or non-critical lab and imaging results will be communicated to you by MyChart, letter or phone within 4 business days after the clinic has received the results. If you do not hear from us within 7 days, please contact the clinic through MyChart or phone. If you have a critical or abnormal lab result, we will notify you by phone as soon as possible.  Submit refill requests through [employer] or call your pharmacy and they will forward the refill request to us. Please allow 3 business days for your refill to be completed.          Additional Information About Your Visit        MyChart Information     [employer] lets you send messages to your doctor, view your test results, renew your prescriptions, schedule appointments and more. To sign up, go to [email or web address] . Click on \"Log in\" on the left side of the screen, which will take you to the Welcome page. Then click on \"Sign up Now\" on the right side of the page.     You will be asked to enter the access code listed below, as well as some personal information. Please follow the directions to create your username and password.     Your access code is: [email or web address]  Expires: 2017 11:07 AM     Your access code will  in 90 days. If you need help or a " new code, please call your [place] clinic or [phone].        Care EveryWhere ID     This is your Care EveryWhere ID. This could be used by other organizations to access your [place] medical records  [id number]        Your Vitals Were     Pulse Temperature Respirations Height BMI (Body Mass Index)       60 97.4  F (36.3  C) (Tympanic) 14 6' (1.829 m) 26.18 kg/m2        Blood Pressure from Last 3 Encounters:   04/06/17 161/99   04/06/17 124/76   03/27/17 128/64    Weight from Last 3 Encounters:   04/06/17 190 lb (86.2 kg)   04/06/17 193 lb (87.5 kg)   03/27/17 191 lb (86.6 kg)              We Performed the Following     US Lower Extremity Venous Duplex Left     XR Ankle Left G/E 3 Views          Today's Medication Changes          These changes are accurate as of: 4/6/17  4:54 PM.  If you have any questions, ask your nurse or doctor.               Start taking these medicines.        Dose/Directions    rivaroxaban ANTICOAGULANT 15 MG Tabs tablet   Commonly known as:  XARELTO   Used for:  Acute deep vein thrombosis (DVT) of left lower extremity, unspecified vein (H)        Dose:  15 mg   Take 1 tablet (15 mg) by mouth 2 times daily (with meals) for 21 days   Quantity:  63 tablet   Refills:  0            Where to get your medicines      These medications were sent to [facility] [id number] [place] [address]  AT [facility] [address] , [facility] [address]     Phone:  [phone]     rivaroxaban ANTICOAGULANT 15 MG Tabs tablet                Primary Care Provider Office Phone # Fax #    Bobbi Alexandra [phone]       [facility] [address] [facility] [address]        Thank you!     Thank you for choosing [facility]  for your care. Our goal is always to provide you with excellent care. Hearing back from our patients is one way we can continue to improve our services. Please take a few minutes to  complete the written survey that you may receive in the mail after your visit with us. Thank you!             Your Updated Medication List - Protect others around you: Learn how to safely use, store and throw away your medicines at www.disposemymeds.org.          This list is accurate as of: 4/6/17  4:54 PM.  Always use your most recent med list.                   Brand Name Dispense Instructions for use    rivaroxaban ANTICOAGULANT 15 MG Tabs tablet    XARELTO    63 tablet    Take 1 tablet (15 mg) by mouth 2 times daily (with meals) for 21 days

## 2017-04-06 NOTE — ED PROVIDER NOTES
"  History     Chief Complaint   Patient presents with     PE     The history is provided by the patient.     Hernando Irizarry is a 41 year old male who presented to the ED along with caregiver for evaluation of a leg DVT.  Hernando had an US of the left leg today for some pain and swelling.  US showed extensive clot burden with a large VTE the was nonadherent to the wall and high risk for PE or CVA.  I was called by Dr. Adam.  I was also called by Dr. Capellan from [facility] who requested IV heparin and air transport for emergent thrombectomy.      No past medical history on file. No past surgical history on file.   Social History     Social History     Marital status: Single     Spouse name: N/A     Number of children: N/A     Years of education: N/A     Social History Main Topics     Smoking status: Never Smoker     Smokeless tobacco: Never Used     Alcohol use No     Drug use: No     Sexual activity: Not on file     Other Topics Concern     Parent/Sibling W/ Cabg, Mi Or Angioplasty Before 65f 55m? No     Social History Narrative    No family history on file.    I have reviewed the Medications, Allergies, Past Medical and Surgical History, and Social History in the Epic system.    Review of Systems   Constitutional: Negative for fever.   Respiratory: Negative for shortness of breath.    Cardiovascular: Positive for leg swelling.   Gastrointestinal: Negative for abdominal pain.   Neurological: Negative for headaches.       Physical Exam   BP: 161/99  Heart Rate: 95  Temp: 97.8  F (36.6  C)  Resp: 18  Height: 185.4 cm (6' 1\")  Weight: 86.2 kg (190 lb)  SpO2: 98 %  Physical Exam   Constitutional: He is oriented to person, place, and time. He appears well-developed and well-nourished. No distress.   Cardiovascular: Normal rate and regular rhythm.    Pulmonary/Chest: Effort normal.   Musculoskeletal: He exhibits edema.   Left lower extremity edema and erythema    Neurological: He is alert and oriented to person, " place, and time.   Nursing note and vitals reviewed.      ED Course     ED Course     Procedures        Results for orders placed or performed during the hospital encounter of 04/06/17 (from the past 24 hour(s))   CBC with platelets differential   Result Value Ref Range    WBC 10.5 4.0 - 11.0 10e9/L    RBC Count 4.88 4.4 - 5.9 10e12/L    Hemoglobin 14.5 13.3 - 17.7 g/dL    Hematocrit 43.6 40.0 - 53.0 %    MCV 89 78 - 100 fl    MCH 29.7 26.5 - 33.0 pg    MCHC 33.3 31.5 - 36.5 g/dL    RDW 14.4 10.0 - 15.0 %    Platelet Count 330 150 - 450 10e9/L    Diff Method Automated Method     % Neutrophils 56.8 %    % Lymphocytes 33.9 %    % Monocytes 7.4 %    % Eosinophils 0.9 %    % Basophils 0.5 %    % Immature Granulocytes 0.5 %    Nucleated RBCs 0 0 /100    Absolute Neutrophil 6.0 1.6 - 8.3 10e9/L    Absolute Lymphocytes 3.6 0.8 - 5.3 10e9/L    Absolute Monocytes 0.8 0.0 - 1.3 10e9/L    Absolute Eosinophils 0.1 0.0 - 0.7 10e9/L    Absolute Basophils 0.1 0.0 - 0.2 10e9/L    Abs Immature Granulocytes 0.1 0 - 0.4 10e9/L    Absolute Nucleated RBC 0.0      Medications   sodium chloride (PF) 0.9% PF flush 3 mL (not administered)   sodium chloride (PF) 0.9% PF flush 3 mL (not administered)   heparin  drip 25,000 units in 0.45% NaCl 250 mL (see additional administration details for dose) (not administered)   heparin bolus from infusion pump (not administered)   heparin Loading Dose from infusion pump *Give when STARTING heparin infusion 6,900 Units (6,900 Units Intravenous Given 4/6/17 1710)        Critical Care time:  none               Labs Ordered and Resulted from Time of ED Arrival Up to the Time of Departure from the ED   CBC WITH PLATELETS DIFFERENTIAL   BASIC METABOLIC PANEL   PERIPHERAL IV CATHETER       Assessments & Plan (with Medical Decision Making)   Arrived and discharged in less than 25 minutes by air.  Heparin bolus and drip started. [facility] transport with unstable injury.     I have reviewed the nursing  notes.    I have reviewed the findings, diagnosis, plan and need for follow up with the patient.    New Prescriptions    No medications on file       Final diagnoses:   Leg DVT (deep venous thromboembolism), acute, left (H)       4/6/2017   [facility]     Chepe Cuevas PA-C  04/06/17 0478

## 2017-04-06 NOTE — PROGRESS NOTES
SUBJECTIVE:  Hernando is a 41 year old male who comes in today for leg swelling, left.  Slipped on the ice a few weeks ago and seemed to injury his ankle.  Was seen in clinic and fitted with brace.  Has been wearing it a lot and possible too tight.  Leg is getting more swollen.  He denies pain.  No fever, chest pain, dyspnea.      No current facility-administered medications for this visit.      Current Outpatient Prescriptions   Medication     rivaroxaban ANTICOAGULANT (XARELTO) 15 MG TABS tablet     Facility-Administered Medications Ordered in Other Visits   Medication     sodium chloride (PF) 0.9% PF flush 3 mL     sodium chloride (PF) 0.9% PF flush 3 mL        Allergies   Allergen Reactions     Bee Venom      Honey Bee Venom Protein (Honey Bee).       History reviewed. No pertinent past medical history.  History reviewed. No pertinent surgical history.  History reviewed. No pertinent family history.  Social History     Social History     Marital status: Single     Spouse name: N/A     Number of children: N/A     Years of education: N/A     Occupational History     Not on file.     Social History Main Topics     Smoking status: Never Smoker     Smokeless tobacco: Never Used     Alcohol use No     Drug use: No     Sexual activity: Not on file     Other Topics Concern     Parent/Sibling W/ Cabg, Mi Or Angioplasty Before 65f 55m? No     Social History Narrative       ROS:  General: negative for, fever, chills  Skin: positive for red left leg, negative for, rash  Resp: No shortness of breath and No cough  CV: positive for lower extremity edema, negative for and chest pain  Musculoskeletal: positive for as above and ankle pain, swelling  Neurologic: negative for, local weakness and numbness or tingling of feet    OBJECTIVE:  Vitals:    04/06/17 1349   BP: 124/76   BP Location: Left arm   Patient Position: Chair   Cuff Size: Adult Large   Pulse: 60   Resp: 14   Temp: 97.4  F (36.3  C)   TempSrc: Tympanic   Weight: 193 lb  (87.5 kg)   Height: 6' (1.829 m)     GENERAL APPEARANCE: healthy, alert and no distress  RESP: lungs clear to auscultation - no rales, rhonchi or wheezes  CV: regular rates and rhythm, normal S1 S2, no S3 or S4 and no murmur, click or rub -  MS: left leg is edematous, erythematous, warm; tender posterior calf, though not really positive Nawaf's sign; ankle with swelling, but no pain with mobility of ankle in all directions  SKIN: erythema as noted  PSYCH: affect normal/bright    Results for orders placed or performed in visit on 04/06/17 (from the past 24 hour(s))   XR Ankle Left G/E 3 Views    Narrative    LEFT ANKLE THREE VIEWS    REPORT:  Distal tibia and fibula, talus and calcaneus appear normal.  There is bony spurring at the insertion of the Achilles tendon in the  calcaneus.  No acute fractures or destructive lesions are seen.  Soft  tissue swelling is noted.    IMPRESSION:   NO ACUTE ANKLE ABNORMALITY.  Exam Date: Apr 06, 2017 02:14:00 PM  Author: SHEYLA SANDOVAL  This report is preliminary and null     US Lower Extremity Venous Duplex Left    Narrative    LEFT LOWER EXTREMITY VENOUS DUPLEX ULTRASOUND    HISTORY:  Left leg swelling.    FINDINGS:  There is extensive thrombosis within the deep venous system  of the left lower extremity.  Non-occlusive thrombus is seen in the  external iliac vein and common femoral vein.  There is occlusive  thrombus from the superficial femoral vein down through the popliteal  vein, including veins of the calf.    IMPRESSION:  EXTENSIVE DEEP VENOUS THROMBOSIS IN THE LEFT LOWER  EXTREMITY AS DESCRIBED ABOVE.  THESE FINDINGS WERE DISCUSSED WITH DR. SIMPSON.  Exam Date: Apr 06, 2017 03:34:00 PM  Author: LUIS MORGAN  This report is final and signed       ASSESSMENT/ORDERS:    ICD-10-CM    1. Left ankle pain, unspecified chronicity M25.572 XR Ankle Left G/E 3 Views   2. Acute deep vein thrombosis (DVT) of left lower extremity, unspecified vein (H) I82.402 rivaroxaban  ANTICOAGULANT (XARELTO) 15 MG TABS tablet   3. Left leg swelling M79.89 US Lower Extremity Venous Duplex Left     PLAN:  Given extensive nature of the clot, I did contact vascular surgery, on call, Dr. Capellan.  We were able to push the images with PACS for her to view.  Initially, felt he could be treated outpatient with either Lovenox or Xarelto, ice, compression, walking, and close follow up with repeat exam and U/S Monday 4/10.  If the clot becomes occlusive in external iliac or common femoral, would need thrombectomy. Also, does need hypercoaguable evaluation ultimately.    Then, upon further review, there appeared to be a piece of clot that had broken off and traveled, that was moving with each heartbeat.  Concern for it to dislodge and travel and cause a PE.  She wanted him transported directly to [place], in supine position, and is setting up helicopter transport.  She did request dose of either Lovenox or Heparin at our ER en route.    Patient is going directly to the ER where he will be transported to [place] for emergent surgical procedure.  Discussed case with our ER staff, Chepe Cuevas.  Vascular surgeon, Dr. Capellan will be expecting patient.    Bhargavi Crawford      I did discuss plan of care with his mother, Loretta Irizarry.  She lives in [place] and will meet up with her son in [place] at [place].

## 2017-04-06 NOTE — ED NOTES
Pt transferred to the OR department at  by [facility]. Report given to [facility] Paramedic and Nurse.

## 2017-04-07 ENCOUNTER — TELEPHONE (OUTPATIENT)
Dept: FAMILY MEDICINE | Facility: OTHER | Age: 42
End: 2017-04-07

## 2017-04-07 ENCOUNTER — TRANSFERRED RECORDS (OUTPATIENT)
Dept: HEALTH INFORMATION MANAGEMENT | Facility: HOSPITAL | Age: 42
End: 2017-04-07

## 2017-04-07 NOTE — TELEPHONE ENCOUNTER
3:13 PM    Reason for Call: Phone Call    Description: Pt called and states that her son was in the hospital and has blood clots and has some questions about this would like a call back regarding this.    Was an appointment offered for this call? No    Preferred method for responding to this message: Telephone [phone]    If we cannot reach you directly, may we leave a detailed response at the number you provided? Yes    Can this message wait until your PCP/provider returns, if available today? Yes    Fadia Langley

## 2017-04-10 NOTE — TELEPHONE ENCOUNTER
Mom here with questions about follow up in [place] would prefer follow up in area . Instructed to ask if vascular surgeon comes up to [place] or if okay to follow up with pprimary verbalizes understanding

## 2017-04-10 NOTE — TELEPHONE ENCOUNTER
Message left for Dr. Smith nurse to see if  Wanted to address questions at 8 30 am today  , Have waited for call back swelling of the ankles again returned call and left mom the phone number at clinic to return call

## 2017-04-14 ENCOUNTER — TRANSFERRED RECORDS (OUTPATIENT)
Dept: HEALTH INFORMATION MANAGEMENT | Facility: HOSPITAL | Age: 42
End: 2017-04-14

## 2017-04-17 ENCOUNTER — TELEPHONE (OUTPATIENT)
Dept: FAMILY MEDICINE | Facility: OTHER | Age: 42
End: 2017-04-17

## 2017-04-17 NOTE — TELEPHONE ENCOUNTER
4:27 PM    Reason for Call: Phone Call    Description: Patient's mother, Loretta, would like to speak to Dr Alexandra's nurse regarding patient and having to travel to [place] to have US every 2 weeks and would rather be able to go to [place]? If you could call back at [phone], Loretta    Was an appointment offered for this call? No    Preferred method for responding to this message: Telephone Call    If we cannot reach you directly, may we leave a detailed response at the number you provided? Yes    Can this message wait until your PCP/provider returns, if available today? YES    Yesenia Morales

## 2017-04-18 NOTE — TELEPHONE ENCOUNTER
Call back to patient and she is contacting [facility] for the transfer

## 2017-04-19 ENCOUNTER — TELEPHONE (OUTPATIENT)
Dept: FAMILY MEDICINE | Facility: OTHER | Age: 42
End: 2017-04-19

## 2017-04-19 NOTE — TELEPHONE ENCOUNTER
MOTHER calling with DVT concerns and has [facility] sending medical records and states he should be followed and wondering if this is something that can be done at this clinic to monitor for reoccurance?

## 2017-04-20 NOTE — TELEPHONE ENCOUNTER
We can usually do the monitoring here, I just need to know why they were suggesting monthly ultrasounds.  We cannot perform the actual ultrasounds/imaging through this clinic site, but we can help to coordinate things locally if the [facility] providers are not willing to order studies in [place].  Will watch for records.

## 2017-04-20 NOTE — TELEPHONE ENCOUNTER
Mom is waiting to hear from vasculare surgeon at [facility] when he needs the next U/S.  She will also be calling  to coordinate his care.  Will wait for records from [facility].  Told mom  could follow pt here.  Is currently taking Xeralto,

## 2017-04-25 DIAGNOSIS — I82.422 ACUTE DEEP VEIN THROMBOSIS OF LEFT ILIAC VEIN (H): Primary | ICD-10-CM

## 2017-05-02 ENCOUNTER — TELEPHONE (OUTPATIENT)
Dept: FAMILY MEDICINE | Facility: OTHER | Age: 42
End: 2017-05-02

## 2017-05-02 DIAGNOSIS — I82.422 ACUTE DEEP VEIN THROMBOSIS (DVT) OF ILIAC VEIN OF LEFT LOWER EXTREMITY (H): Primary | ICD-10-CM

## 2017-05-02 NOTE — TELEPHONE ENCOUNTER
Was told to take xarelto BID for a number of days and change to daily and just picked up a bottle signed by Dr Adam for 60 tabs and BID when is it time to go to daily

## 2017-05-02 NOTE — TELEPHONE ENCOUNTER
Hospital discharge note recommended Xarelto 15 mg BID for 21 days, then 20 mg daily for a total of 6 months.  One week follow-up ultrasound was recommended after hospital discharge (already completed), then we usually repeat an ultrasound prior to stopping Xarelto at 6 months.  He should currently be taking Xarelto 20 mg daily, new script sent to pharmacy.

## 2017-05-02 NOTE — TELEPHONE ENCOUNTER
Reason for call:  Medication    1. Medication Name? unknown  2. Is this request for a refill? No  3. What Pharmacy do you use? n/a  4. Have you contacted your pharmacy? n/a    5. If yes, when?  (Please note that the turn-around-time for prescriptions is 72 business hours; I am sending your request at this time. SEND TO  [facility]  )  Description: Pt's mother called to ask a question about the medication that wasn't found in pt's chart.  Pt went to [facility] at the end of April to Dr. Maldonado for vacisuler surgery.  There was a discrepancy with the dosage of medication since then that the mother would like nurse of PCP Dr. Feldman to verify.  Nurse please advise with a returned call.  Was an appointment offered for this a call? No   Preferred method for responding to this messageTelephone Call: [phone] mother Loretta expecting the returned call.  If we cannot reach you directly, may we leave a detailed response at the number you provided? yes  Can this message wait until your PCP/Provider returns if not available today? Not applicable, PCP [facility] is in today

## 2017-05-12 ENCOUNTER — OFFICE VISIT (OUTPATIENT)
Dept: FAMILY MEDICINE | Facility: OTHER | Age: 42
End: 2017-05-12
Attending: NURSE PRACTITIONER
Payer: MEDICARE

## 2017-05-12 ENCOUNTER — HOSPITAL ENCOUNTER (OUTPATIENT)
Dept: ULTRASOUND IMAGING | Facility: HOSPITAL | Age: 42
Discharge: HOME | End: 2017-05-12
Attending: SURGERY | Admitting: SURGERY
Payer: MEDICARE

## 2017-05-12 VITALS
DIASTOLIC BLOOD PRESSURE: 74 MMHG | HEART RATE: 61 BPM | WEIGHT: 194 LBS | BODY MASS INDEX: 25.6 KG/M2 | SYSTOLIC BLOOD PRESSURE: 130 MMHG | OXYGEN SATURATION: 99 % | TEMPERATURE: 98.2 F | RESPIRATION RATE: 20 BRPM

## 2017-05-12 DIAGNOSIS — I82.402 ACUTE DEEP VEIN THROMBOSIS (DVT) OF LEFT LOWER EXTREMITY, UNSPECIFIED VEIN (H): Primary | ICD-10-CM

## 2017-05-12 PROCEDURE — 93971 EXTREMITY STUDY: CPT | Mod: TC,LT

## 2017-05-12 PROCEDURE — 99212 OFFICE O/P EST SF 10 MIN: CPT

## 2017-05-12 PROCEDURE — 99214 OFFICE O/P EST MOD 30 MIN: CPT | Performed by: NURSE PRACTITIONER

## 2017-05-12 ASSESSMENT — PAIN SCALES - GENERAL: PAINLEVEL: NO PAIN (0)

## 2017-05-12 NOTE — NURSING NOTE
"Chief Complaint   Patient presents with     Leg Pain     left leg discomfort. discoloration. \"turned pink\" has surgery to remove a blood clot from this area last month.       Initial /74 (BP Location: Right arm, Patient Position: Chair, Cuff Size: Adult Regular)  Pulse 61  Temp 98.2  F (36.8  C) (Tympanic)  Resp 20  Wt 194 lb (88 kg)  SpO2 99%  BMI 25.6 kg/m2 Estimated body mass index is 25.6 kg/(m^2) as calculated from the following:    Height as of 4/6/17: 6' 1\" (1.854 m).    Weight as of this encounter: 194 lb (88 kg).  Medication Reconciliation: linette Juan LPN    "

## 2017-05-12 NOTE — PROGRESS NOTES
"SUBJECTIVE:  Hernando Irizarry, 41 year old, male presents with the following Chief Complaint(s) with HPI to follow:  Chief Complaint   Patient presents with     Leg Pain     left leg discomfort. discoloration. \"turned pink\" has surgery to remove a blood clot from this area last month.          HPI:  Hernando Irizarry presents today in follow-up of left lower extremity DVT.  He came to see Dr. Adam with left lower extremity pain on 4/6/2017; us was ordered with the following results:        LEFT LOWER EXTREMITY VENOUS DUPLEX ULTRASOUND     HISTORY: Left leg swelling.     FINDINGS: There is extensive thrombosis within the deep venous system  of the left lower extremity. Non-occlusive thrombus is seen in the  external iliac vein and common femoral vein. There is occlusive  thrombus from the superficial femoral vein down through the popliteal  vein, including veins of the calf.     IMPRESSION: EXTENSIVE DEEP VENOUS THROMBOSIS IN THE LEFT LOWER  EXTREMITY AS DESCRIBED ABOVE. THESE FINDINGS WERE DISCUSSED WITH DR. ADAM.  Exam Date: Apr 06, 2017 03:34:00 PM  Author: LUIS MORGAN  This report is final and signed    He was sent to the ED for evaluation, they consulted Dr Capellan who requested IV heparin and air transport for emergent thrombectomy.      Surgery was performed 4/6/2017, he was discharged 4/7/2017back to group home on 15mg xarelto twice daily for one week then increased to 20mg daily - he continues on this dose.      Repeat US done at [facility] on 4/14/2017 indicated stable clot.      He continues wearing compression stockings.  He is walking and elevating lower extremity when sitting.      He is concerned because pain, swelling and redness continue.  States pain does not interfere with activities and is not like it was prior to surgery.  Denies fever, chest pain, shortness of breath.        Patient Active Problem List   Diagnosis     MR (mental retardation)     Past Surgical History:   Procedure Laterality " Date     VASCULAR SURGERY  04/06/2017    blood clot removal from left lower leg. [facility] [place].       Social History   Substance Use Topics     Smoking status: Never Smoker     Smokeless tobacco: Never Used     Alcohol use No     Family History   Problem Relation Age of Onset     DIABETES No family hx of      Coronary Artery Disease No family hx of      Other Cancer No family hx of          Current Outpatient Prescriptions   Medication Sig Dispense Refill     rivaroxaban ANTICOAGULANT (XARELTO) 20 MG TABS tablet Take 1 tablet (20 mg) by mouth daily (with dinner) 30 tablet 5     Allergies   Allergen Reactions     Bee Venom      Honey Bee Venom Protein (Honey Bee).     Recent Labs   Lab Test  04/06/17   1700   CR  0.72   GFRESTIMATED  >90  Non  GFR Calc     GFRESTBLACK  >90   GFR Calc     POTASSIUM  3.8      BP Readings from Last 3 Encounters:   05/12/17 130/74   04/06/17 161/99   04/06/17 124/76    Wt Readings from Last 3 Encounters:   05/12/17 194 lb (88 kg)   04/06/17 190 lb (86.2 kg)   04/06/17 193 lb (87.5 kg)                    REVIEW OF SYSTEMS  Skin: as above, left lower extremity  Eyes: negative  Ears/Nose/Throat: negative  Respiratory: No shortness of breath, dyspnea on exertion, cough, or hemoptysis  Cardiovascular: as above  Gastrointestinal: negative  Genitourinary: negative  Musculoskeletal: negative  Neurologic: MR  Psychiatric: negative  Hematologic/Lymphatic/Immunologic: negative  Endocrine: negative    OBJECTIVE:    /74 (BP Location: Right arm, Patient Position: Chair, Cuff Size: Adult Regular)  Pulse 61  Temp 98.2  F (36.8  C) (Tympanic)  Resp 20  Wt 194 lb (88 kg)  SpO2 99%  BMI 25.6 kg/m2  Constitutional: healthy, alert and no distress  Neck: neck supple, no lymphadenopathy, trachea midline, thyroid symmetrical with out nodules noted.  Carotid arteries without bruit  Cardiovascular: RRR. No murmurs, clicks gallops or rub,  Pedal pulses intact  bilateral, left lower extremity is mildly edematous, erythematous when dependent.  Capillary refill normal.  No skin warmth noted.    Respiratory:  Good diaphragmatic excursion. Lungs clear  Psychiatric: mentation appears normal and affect normal/bright      ASSESSMENT / PLAN:  1. Acute deep vein thrombosis (DVT) of left lower extremity, unspecified vein (H)  Continue compression stockings  Follow-up US today  Elevate left leg when on breaks and lunch time  Continue xarelto     Spoke with nAnette in US - verbal report of thrombus noted in prox SSV, prox calf, external iliac and common femoral are clear.      Call placed to Vascular surgery, [facility] and spoke with Dr Capellan.  Appears clot is stable, continue use of compression stockings, xarelto 20mg daily and elevate lower extremity with sitting.  He can continue walking and working usual schedule.      Follow-up with vascular with any further questions or to ED with acute concerns    50 minute visit today in review of outside records, care coordination and patient education.      Maureen Jacobs,   Certified Adult Nurse Practitioner  [phone]

## 2017-05-12 NOTE — PATIENT INSTRUCTIONS
ASSESSMENT / PLAN:  1. Acute deep vein thrombosis (DVT) of left lower extremity, unspecified vein (H)  Continue compression stockings  Follow-up US today  Elevate left leg when on breaks and lunch time and when sitting  Continue xarelto         Follow-up to ED with acute concerns    Maureen Jacobs,   Certified Adult Nurse Practitioner  [phone]

## 2017-05-12 NOTE — MR AVS SNAPSHOT
After Visit Summary   5/12/2017    Hernando Irizarry    MRN: [id number]           Patient Information     Date Of Birth          1975        Visit Information        Provider Department      5/12/2017 10:00 AM Maureen Jacobs NP [facility]        Today's Diagnoses     Acute deep vein thrombosis (DVT) of left lower extremity, unspecified vein (H)    -  1      Care Instructions      ASSESSMENT / PLAN:  1. Acute deep vein thrombosis (DVT) of left lower extremity, unspecified vein (H)  Continue compression stockings  Follow-up US today  Elevate left leg when on breaks and lunch time and when sitting  Continue xarelto         Follow-up to ED with acute concerns    Maureen Jacobs,   Certified Adult Nurse Practitioner  [phone]        Follow-ups after your visit        Your next 10 appointments already scheduled     May 12, 2017 12:00 PM CDT   Radiology with HI ULTRASOUND 2   HI Ultrasound ([facility] )    [address] [place] [id number]   [phone]              Who to contact     If you have questions or need follow up information about today's clinic visit or your schedule please contact [facility] directly at [phone].  Normal or non-critical lab and imaging results will be communicated to you by MyChart, letter or phone within 4 business days after the clinic has received the results. If you do not hear from us within 7 days, please contact the clinic through MyChart or phone. If you have a critical or abnormal lab result, we will notify you by phone as soon as possible.  Submit refill requests through [facility] or call your pharmacy and they will forward the refill request to us. Please allow 3 business days for your refill to be completed.          Additional Information About Your Visit        MyChart Information     [facility] lets you send messages to your doctor, view your test results, renew your prescriptions, schedule  "appointments and more. To sign up, go to [email or web address] . Click on \"Log in\" on the left side of the screen, which will take you to the Welcome page. Then click on \"Sign up Now\" on the right side of the page.     You will be asked to enter the access code listed below, as well as some personal information. Please follow the directions to create your username and password.     Your access code is: [email or web address]  Expires: 2017 11:07 AM     Your access code will  in 90 days. If you need help or a new code, please call your [facility] clinic or [phone].        Care EveryWhere ID     This is your Care EveryWhere ID. This could be used by other organizations to access your [facility] medical records  [id number]        Your Vitals Were     Pulse Temperature Respirations Pulse Oximetry BMI (Body Mass Index)       61 98.2  F (36.8  C) (Tympanic) 20 99% 25.6 kg/m2        Blood Pressure from Last 3 Encounters:   17 130/74   17 161/99   17 124/76    Weight from Last 3 Encounters:   17 194 lb (88 kg)   17 190 lb (86.2 kg)   17 193 lb (87.5 kg)              Today, you had the following     No orders found for display       Primary Care Provider Office Phone # Fax #    Bobbi ANASTACIO Alexandra [phone]       [address] [address]        Thank you!     Thank you for choosing [facility]  for your care. Our goal is always to provide you with excellent care. Hearing back from our patients is one way we can continue to improve our services. Please take a few minutes to complete the written survey that you may receive in the mail after your visit with us. Thank you!             Your Updated Medication List - Protect others around you: Learn how to safely use, store and throw away your medicines at www.disposemymeds.org.          This list is accurate as of: 17 10:54 AM.  Always use your most recent " med list.                   Brand Name Dispense Instructions for use    rivaroxaban ANTICOAGULANT 20 MG Tabs tablet    XARELTO    30 tablet    Take 1 tablet (20 mg) by mouth daily (with dinner)

## 2017-07-03 DIAGNOSIS — I82.402 LEFT LEG DVT (H): Primary | ICD-10-CM

## 2017-08-14 ENCOUNTER — HOSPITAL ENCOUNTER (OUTPATIENT)
Dept: ULTRASOUND IMAGING | Facility: HOSPITAL | Age: 42
Discharge: HOME | End: 2017-08-14
Attending: SURGERY | Admitting: SURGERY
Payer: MEDICARE

## 2017-08-14 PROCEDURE — 93971 EXTREMITY STUDY: CPT | Mod: TC,LT

## 2017-10-11 DIAGNOSIS — I82.422 ACUTE DEEP VEIN THROMBOSIS (DVT) OF ILIAC VEIN OF LEFT LOWER EXTREMITY (H): ICD-10-CM

## 2017-10-11 NOTE — TELEPHONE ENCOUNTER
Xarelto           Last Written Prescription Date: 5/2/17  Last Fill Quantity: 30, # refills: 5    Last Office Visit with [facility] or [facility] prescribing provider:  5/12/17   Future Office Visit:       Lab Results   Component Value Date    WBC 10.5 04/06/2017     Lab Results   Component Value Date    RBC 4.88 04/06/2017     Lab Results   Component Value Date    HGB 14.5 04/06/2017     Lab Results   Component Value Date    HCT 43.6 04/06/2017     No components found for: MCT  Lab Results   Component Value Date    MCV 89 04/06/2017     Lab Results   Component Value Date    MCH 29.7 04/06/2017     Lab Results   Component Value Date    MCHC 33.3 04/06/2017     Lab Results   Component Value Date    RDW 14.4 04/06/2017     Lab Results   Component Value Date     04/06/2017     No results found for: AST  No results found for: ALT  Creatinine   Date Value Ref [facility] Status   04/06/2017 0.72 0.66 - 1.25 mg/dL Final   ]

## 2017-10-13 RX ORDER — RIVAROXABAN 20 MG/1
TABLET, FILM COATED ORAL
Qty: 30 TABLET | Refills: 0 | Status: SHIPPED | OUTPATIENT
Start: 2017-10-13 | End: 2017-11-13

## 2017-11-13 DIAGNOSIS — I82.422 ACUTE DEEP VEIN THROMBOSIS (DVT) OF ILIAC VEIN OF LEFT LOWER EXTREMITY (H): ICD-10-CM

## 2017-11-13 NOTE — TELEPHONE ENCOUNTER
Reason for call:  Medication    1. Medication Name? Xarelto  2. Is this request for a refill? Yes  3. What Pharmacy do you use?   [facility]  4. Have you contacted your pharmacy?  Yes  5. If yes, when? Friday  (Please note that the turn-around-time for prescriptions is 72 business hours; I am sending your request at this time. SEND TO  [facility]  )  Description:   The patient is completely out. He has been out for 2 days and gets blood clots  Was an appointment offered for this a call?   No  Preferred method for responding to this message  [phone]  If we cannot reach you directly, may we leave a detailed response at the number you provided?  Yes

## 2017-11-13 NOTE — TELEPHONE ENCOUNTER
Xarelto  Last office visit: 5/12/17  Last refill: 10/13/17 #30, 0 R.  No LFT or ALT/AST on file.  Medication pended.  No requests sent from pharmacy.  Please advise.  Thank you.

## 2018-01-07 DIAGNOSIS — I82.422 ACUTE DEEP VEIN THROMBOSIS (DVT) OF ILIAC VEIN OF LEFT LOWER EXTREMITY (H): ICD-10-CM

## 2018-01-09 RX ORDER — RIVAROXABAN 20 MG/1
TABLET, FILM COATED ORAL
Qty: 30 TABLET | Refills: 1 | Status: SHIPPED | OUTPATIENT
Start: 2018-01-09 | End: 2018-05-10

## 2018-01-09 NOTE — TELEPHONE ENCOUNTER
Xarelto       Last Written Prescription Date:  11/13/2017  Last Fill Quantity: 30,   # refills: 1  Last Office Visit: 5/12/2017  Future Office visit:

## 2018-01-09 NOTE — TELEPHONE ENCOUNTER
Xarelto  Please see note below.  Patient due for labs.  Please advise.  Thank you.   Normal ALT on file in past 12 months       No lab results found.               Normal AST on file in past 12 months       No lab results found.

## 2018-04-16 ENCOUNTER — MEDICAL CORRESPONDENCE (OUTPATIENT)
Dept: HEALTH INFORMATION MANAGEMENT | Facility: CLINIC | Age: 43
End: 2018-04-16

## 2018-04-16 DIAGNOSIS — I82.522 CHRONIC DEEP VEIN THROMBOSIS (DVT) OF ILIAC VEIN OF LEFT LOWER EXTREMITY (H): Primary | ICD-10-CM

## 2018-05-10 ENCOUNTER — OFFICE VISIT (OUTPATIENT)
Dept: FAMILY MEDICINE | Facility: OTHER | Age: 43
End: 2018-05-10
Attending: FAMILY MEDICINE
Payer: MEDICARE

## 2018-05-10 VITALS
TEMPERATURE: 98.8 F | WEIGHT: 192 LBS | DIASTOLIC BLOOD PRESSURE: 80 MMHG | RESPIRATION RATE: 14 BRPM | OXYGEN SATURATION: 98 % | BODY MASS INDEX: 25.33 KG/M2 | SYSTOLIC BLOOD PRESSURE: 108 MMHG | HEART RATE: 76 BPM

## 2018-05-10 DIAGNOSIS — R07.0 THROAT PAIN: Primary | ICD-10-CM

## 2018-05-10 DIAGNOSIS — J30.89 CHRONIC NON-SEASONAL ALLERGIC RHINITIS, UNSPECIFIED TRIGGER: ICD-10-CM

## 2018-05-10 LAB
DEPRECATED S PYO AG THROAT QL EIA: NORMAL
SPECIMEN SOURCE: NORMAL

## 2018-05-10 PROCEDURE — G0463 HOSPITAL OUTPT CLINIC VISIT: HCPCS | Mod: 25

## 2018-05-10 PROCEDURE — 99213 OFFICE O/P EST LOW 20 MIN: CPT | Performed by: NURSE PRACTITIONER

## 2018-05-10 PROCEDURE — 87081 CULTURE SCREEN ONLY: CPT | Mod: ZL | Performed by: NURSE PRACTITIONER

## 2018-05-10 PROCEDURE — G0463 HOSPITAL OUTPT CLINIC VISIT: HCPCS

## 2018-05-10 PROCEDURE — 87880 STREP A ASSAY W/OPTIC: CPT | Mod: ZL | Performed by: NURSE PRACTITIONER

## 2018-05-10 RX ORDER — FLUTICASONE PROPIONATE 50 MCG
1-2 SPRAY, SUSPENSION (ML) NASAL DAILY
Qty: 1 BOTTLE | Refills: 11 | Status: SHIPPED | OUTPATIENT
Start: 2018-05-10 | End: 2019-02-07

## 2018-05-10 ASSESSMENT — ANXIETY QUESTIONNAIRES
3. WORRYING TOO MUCH ABOUT DIFFERENT THINGS: NOT AT ALL
GAD7 TOTAL SCORE: 0
IF YOU CHECKED OFF ANY PROBLEMS ON THIS QUESTIONNAIRE, HOW DIFFICULT HAVE THESE PROBLEMS MADE IT FOR YOU TO DO YOUR WORK, TAKE CARE OF THINGS AT HOME, OR GET ALONG WITH OTHER PEOPLE: NOT DIFFICULT AT ALL
5. BEING SO RESTLESS THAT IT IS HARD TO SIT STILL: NOT AT ALL
4. TROUBLE RELAXING: NOT AT ALL
2. NOT BEING ABLE TO STOP OR CONTROL WORRYING: NOT AT ALL
6. BECOMING EASILY ANNOYED OR IRRITABLE: NOT AT ALL
7. FEELING AFRAID AS IF SOMETHING AWFUL MIGHT HAPPEN: NOT AT ALL
1. FEELING NERVOUS, ANXIOUS, OR ON EDGE: NOT AT ALL

## 2018-05-10 ASSESSMENT — PAIN SCALES - GENERAL: PAINLEVEL: NO PAIN (0)

## 2018-05-10 NOTE — NURSING NOTE
"Chief Complaint   Patient presents with     URI       Initial /80 (BP Location: Right arm, Patient Position: Sitting, Cuff Size: Adult Regular)  Pulse 76  Temp 98.8  F (37.1  C)  Resp 14  Wt 192 lb (87.1 kg)  SpO2 98%  BMI 25.33 kg/m2 Estimated body mass index is 25.33 kg/(m^2) as calculated from the following:    Height as of 4/6/17: 6' 1\" (1.854 m).    Weight as of this encounter: 192 lb (87.1 kg).  Medication Reconciliation: complete    aRchelle Leong LPN    "

## 2018-05-10 NOTE — MR AVS SNAPSHOT
After Visit Summary   5/10/2018    Hernando Irizarry    MRN: [id number]           Patient Information     Date Of Birth          1975        Visit Information        Provider Department      5/10/2018 2:15 PM Maureen Jacobs NP [facility]        Today's Diagnoses     Throat pain    -  1    Chronic non-seasonal allergic rhinitis, unspecified trigger          Care Instructions      ASSESSMENT/PLAN:       1. Throat pain  - Rapid strep screen - negative  - Beta strep group A culture - pending    2. Chronic non-seasonal allergic rhinitis, unspecified trigger  - Start over the counter allergy medication such as claritin, zyrtec, allegra or xyzal (generic is ok)  - fluticasone (FLONASE) 50 MCG/ACT spray; Spray 1-2 sprays into both nostrils daily  Dispense: 1 Bottle; Refill: 11      FUTURE APPOINTMENTS:       - Follow-up visit if no improvement or any worsening     Maureen Jacobs NP  [facility]    Allergic Rhinitis  Allergic rhinitis is an allergic reaction that affects the nose, and often the eyes. It s often known as nasal allergies. Nasal allergies are often due to things in the environment that are breathed in. Depending what you are sensitive to, nasal allergies may occur only during certain seasons. Or they may occur year round. Common indoor allergens include house dust mites, mold, cockroaches, and pet dander. Outdoor allergens include pollen from trees, grasses, and weeds.   Symptoms include a drippy, stuffy, and itchy nose. They also include sneezing and red and itchy eyes. You may feel tired more often. Severe allergies may also affect your breathing and trigger a condition called asthma.   Tests can be done to see what allergens are affecting you. You may be referred to an allergy specialist for testing and further evaluation.  Home care  Your healthcare provider may prescribe medicines to help relieve allergy symptoms. These may include oral  medicines, nasal sprays, or eye drops.  Ask your provider for advice on how to avoid substances that you are allergic to. Below are a few tips for each type of allergen.  Pet dander:    Do not have pets with fur and feathers.    If you can't avoid having a pet, keep it out of your bedroom and off upholstered furniture.  Pollen:    When pollen counts are high, keep windows of your car and home closed. If possible, use an air conditioner instead.    Wear a filter mask when mowing or doing yard work.  House dust mites:    Wash bedding every week in warm water and detergent and dry on a hot setting.    Cover the mattress, box spring, and pillows with allergy covers.     If possible, sleep in a room with no carpet, curtains, or upholstered furniture.  Cockroaches:    Store food in sealed containers.    Remove garbage from the home promptly.    Fix water leaks  Mold:    Keep humidity low by using a dehumidifier or air conditioner. Keep the dehumidifier and air conditioner clean and free of mold.    Clean moldy areas with bleach and water.  In general:    Vacuum once or twice a week. If possible, use a vacuum with a high-efficiency particulate air (HEPA) filter.    Do not smoke. Avoid cigarette smoke. Cigarette smoke is an irritant that can make symptoms worse.  Follow-up care  Follow up as advised by the healthcare provider or our staff. If you were referred to an allergy specialist, make this appointment promptly.  When to seek medical advice  Call your healthcare provider right away if the following occur:    Coughing or wheezing    Fever of 100.4 F (38 C) or higher, or as directed by your healthcare provider    Raised red bumps (hives)    Continuing symptoms, new symptoms, or worsening symptoms  Call 911 if you have:    Trouble breathing    Severe swelling of the face or severe itching of the eyes or mouth  Date Last Reviewed: 3/1/2017    0361-8555 [employer]. [address] [place] [address], [place] [address].  "All rights reserved. This information is not intended as a substitute for professional medical care. Always follow your healthcare professional's instructions.                Follow-ups after your visit        Your next 10 appointments already scheduled     May 15, 2018  3:45 PM CDT   LAB with [facility]   [facility] ([facility] )    [address]  [place] [address]   [phone]              Who to contact     If you have questions or need follow up information about today's clinic visit or your schedule please contact [facility] directly at [phone].  Normal or non-critical lab and imaging results will be communicated to you by [employer]hart, letter or phone within 4 business days after the clinic has received the results. If you do not hear from us within 7 days, please contact the clinic through [employer] or phone. If you have a critical or abnormal lab result, we will notify you by phone as soon as possible.  Submit refill requests through [employer] or call your pharmacy and they will forward the refill request to us. Please allow 3 business days for your refill to be completed.          Additional Information About Your Visit        MyChart Information     [employer] lets you send messages to your doctor, view your test results, renew your prescriptions, schedule appointments and more. To sign up, go to [email or web address] . Click on \"Log in\" on the left side of the screen, which will take you to the Welcome page. Then click on \"Sign up Now\" on the right side of the page.     You will be asked to enter the access code listed below, as well as some personal information. Please follow the directions to create your username and password.     Your access code is: [email or web address]  Expires: 2018  2:46 PM     Your access code will  in 90 days. If you need help or a new code, please call your [facility] or [phone].        Care EveryWhere ID  "    This is your Care EveryWhere ID. This could be used by other organizations to access your [facility] medical records  [id number]        Your Vitals Were     Pulse Temperature Respirations Pulse Oximetry BMI (Body Mass Index)       76 98.8  F (37.1  C) 14 98% 25.33 kg/m2        Blood Pressure from Last 3 Encounters:   05/10/18 108/80   05/12/17 130/74   04/06/17 161/99    Weight from Last 3 Encounters:   05/10/18 192 lb (87.1 kg)   05/12/17 194 lb (88 kg)   04/06/17 190 lb (86.2 kg)              We Performed the Following     Beta strep group A culture     Rapid strep screen          Today's Medication Changes          These changes are accurate as of 5/10/18  2:46 PM.  If you have any questions, ask your nurse or doctor.               Start taking these medicines.        Dose/Directions    fluticasone 50 MCG/ACT spray   Commonly known as:  FLONASE   Used for:  Chronic non-seasonal allergic rhinitis, unspecified trigger   Started by:  Maureen Jacobs, NP        Dose:  1-2 spray   Spray 1-2 sprays into both nostrils daily   Quantity:  1 Bottle   Refills:  11            Where to get your medicines      These medications were sent to [facility] [address] [place] [address]  AT [facility] [address]  [address] [facility] [address]     Phone:  [phone]     fluticasone 50 MCG/ACT spray                Primary Care Provider Office Phone # Fax #    Bobbi ANASTACIO Alexandra [phone] [address] [facility] [address]        Equal Access to Services     [facility] Hospital[facility]AL[facility]YA AH: Hadii aad ku hadasho Soomaali, waaxda luqadaha, qaybta kaalmada adeegyada, bennett gibson. So [facility] [phone].    ATENCIÓN: Si habla español, tiene a peter disposición servicios gratuitos de asistencia lingüística. Llame al [phone].    We comply with applicable federal civil rights laws and [place] laws. We do not discriminate on the basis of  race, color, national origin, age, disability, sex, sexual orientation, or gender identity.            Thank you!     Thank you for choosing [facility]  for your care. Our goal is always to provide you with excellent care. Hearing back from our patients is one way we can continue to improve our services. Please take a few minutes to complete the written survey that you may receive in the mail after your visit with us. Thank you!             Your Updated Medication List - Protect others around you: Learn how to safely use, store and throw away your medicines at www.disposemymeds.org.          This list is accurate as of 5/10/18  2:46 PM.  Always use your most recent med list.                   Brand Name Dispense Instructions for use Diagnosis    fluticasone 50 MCG/ACT spray    FLONASE    1 Bottle    Spray 1-2 sprays into both nostrils daily    Chronic non-seasonal allergic rhinitis, unspecified trigger

## 2018-05-10 NOTE — PATIENT INSTRUCTIONS
ASSESSMENT/PLAN:       1. Throat pain  - Rapid strep screen - negative  - Beta strep group A culture - pending    2. Chronic non-seasonal allergic rhinitis, unspecified trigger  - Start over the counter allergy medication such as claritin, zyrtec, allegra or xyzal (generic is ok)  - fluticasone (FLONASE) 50 MCG/ACT spray; Spray 1-2 sprays into both nostrils daily  Dispense: 1 Bottle; Refill: 11      FUTURE APPOINTMENTS:       - Follow-up visit if no improvement or any worsening     Maureen Jacobs NP  [facility]    Allergic Rhinitis  Allergic rhinitis is an allergic reaction that affects the nose, and often the eyes. It s often known as nasal allergies. Nasal allergies are often due to things in the environment that are breathed in. Depending what you are sensitive to, nasal allergies may occur only during certain seasons. Or they may occur year round. Common indoor allergens include house dust mites, mold, cockroaches, and pet dander. Outdoor allergens include pollen from trees, grasses, and weeds.   Symptoms include a drippy, stuffy, and itchy nose. They also include sneezing and red and itchy eyes. You may feel tired more often. Severe allergies may also affect your breathing and trigger a condition called asthma.   Tests can be done to see what allergens are affecting you. You may be referred to an allergy specialist for testing and further evaluation.  Home care  Your healthcare provider may prescribe medicines to help relieve allergy symptoms. These may include oral medicines, nasal sprays, or eye drops.  Ask your provider for advice on how to avoid substances that you are allergic to. Below are a few tips for each type of allergen.  Pet dander:    Do not have pets with fur and feathers.    If you can't avoid having a pet, keep it out of your bedroom and off upholstered furniture.  Pollen:    When pollen counts are high, keep windows of your car and home closed. If possible, use an air  conditioner instead.    Wear a filter mask when mowing or doing yard work.  House dust mites:    Wash bedding every week in warm water and detergent and dry on a hot setting.    Cover the mattress, box spring, and pillows with allergy covers.     If possible, sleep in a room with no carpet, curtains, or upholstered furniture.  Cockroaches:    Store food in sealed containers.    Remove garbage from the home promptly.    Fix water leaks  Mold:    Keep humidity low by using a dehumidifier or air conditioner. Keep the dehumidifier and air conditioner clean and free of mold.    Clean moldy areas with bleach and water.  In general:    Vacuum once or twice a week. If possible, use a vacuum with a high-efficiency particulate air (HEPA) filter.    Do not smoke. Avoid cigarette smoke. Cigarette smoke is an irritant that can make symptoms worse.  Follow-up care  Follow up as advised by the healthcare provider or our staff. If you were referred to an allergy specialist, make this appointment promptly.  When to seek medical advice  Call your healthcare provider right away if the following occur:    Coughing or wheezing    Fever of 100.4 F (38 C) or higher, or as directed by your healthcare provider    Raised red bumps (hives)    Continuing symptoms, new symptoms, or worsening symptoms  Call 911 if you have:    Trouble breathing    Severe swelling of the face or severe itching of the eyes or mouth  Date Last Reviewed: 3/1/2017    3116-1523 [employer]. [address] [place] [address]. All rights reserved. This information is not intended as a substitute for professional medical care. Always follow your healthcare professional's instructions.

## 2018-05-10 NOTE — PROGRESS NOTES
SUBJECTIVE:   Hernando Irizarry is a 42 year old male who presents to clinic today for the following health issues:      Acute Illness   Acute illness concerns: URI  Onset: 1 month    Fever: no     Chills/Sweats: no     Headache (location?): no     Sinus Pressure:no    Conjunctivitis:  no    Ear Pain: no    Rhinorrhea: YES    Congestion: no     Sore Throat: YES     Cough: YES - productive     Wheeze: no     Decreased Appetite: no     Nausea: no     Vomiting: no     Diarrhea:  no     Dysuria/Freq.: no     Fatigue/Achiness: no     Sick/Strep Exposure: no      Therapies Tried and outcome: cough syrup, no help        Problem list and histories reviewed & adjusted, as indicated.  Additional history: as documented    Patient Active Problem List   Diagnosis     MR (mental retardation)     Past Surgical History:   Procedure Laterality Date     VASCULAR SURGERY  04/06/2017    blood clot removal from left lower leg. [facility].       Social History   Substance Use Topics     Smoking status: Never Smoker     Smokeless tobacco: Never Used     Alcohol use No     Family History   Problem Relation Age of Onset     DIABETES No family hx of      Coronary Artery Disease No family hx of      Other Cancer No family hx of          No current outpatient prescriptions on file.     Allergies   Allergen Reactions     Bee Venom      Honey Bee Venom Protein (Honey Bee).     Recent Labs   Lab Test  04/06/17   1700   CR  0.72   GFRESTIMATED  >90  Non  GFR Calc     GFRESTBLACK  >90   GFR Calc     POTASSIUM  3.8      BP Readings from Last 3 Encounters:   05/10/18 108/80   05/12/17 130/74   04/06/17 161/99    Wt Readings from Last 3 Encounters:   05/10/18 192 lb (87.1 kg)   05/12/17 194 lb (88 kg)   04/06/17 190 lb (86.2 kg)                    Reviewed and updated as needed this visit by clinical staff  Allergies  Meds       Reviewed and updated as needed this visit by Provider         ROS:  Constitutional,  HEENT, cardiovascular, pulmonary, gi and gu systems are negative, except as otherwise noted.    OBJECTIVE:     /80 (BP Location: Right arm, Patient Position: Sitting, Cuff Size: Adult Regular)  Pulse 76  Temp 98.8  F (37.1  C)  Resp 14  Wt 192 lb (87.1 kg)  SpO2 98%  BMI 25.33 kg/m2  Body mass index is 25.33 kg/(m^2).  GENERAL: healthy, alert and no distress  HENT: normal cephalic/atraumatic, both ears: clear effusion, nose and mouth without ulcers or lesions, nasal mucosa edematous , rhinorrhea clear and oral mucous membranes moist, throat with clear post nasal drip with cobblestoning  NECK: no adenopathy, no asymmetry, masses, or scars and thyroid normal to palpation  RESP: lungs clear to auscultation - no rales, rhonchi or wheezes  CV: regular rate and rhythm, normal S1 S2, no S3 or S4, no murmur, click or rub, no peripheral edema and peripheral pulses strong  ABDOMEN: soft, nontender, no hepatosplenomegaly, no masses and bowel sounds normal  MS: no gross musculoskeletal defects noted, no edema  NEURO: Normal strength and tone, mentation intact and speech normal  PSYCH: mentation appears normal, affect normal/bright        ASSESSMENT/PLAN:       1. Throat pain  - Rapid strep screen - negative  - Beta strep group A culture - pending    2. Chronic non-seasonal allergic rhinitis, unspecified trigger  - Start over the counter allergy medication such as claritin, zyrtec, allegra or xyzal (generic is ok)  - fluticasone (FLONASE) 50 MCG/ACT spray; Spray 1-2 sprays into both nostrils daily  Dispense: 1 Bottle; Refill: 11      FUTURE APPOINTMENTS:       - Follow-up visit if no improvement or any worsening     Maureen Jacobs NP  [facility]

## 2018-05-11 ASSESSMENT — PATIENT HEALTH QUESTIONNAIRE - PHQ9: SUM OF ALL RESPONSES TO PHQ QUESTIONS 1-9: 0

## 2018-05-11 ASSESSMENT — ANXIETY QUESTIONNAIRES: GAD7 TOTAL SCORE: 0

## 2018-05-12 LAB
BACTERIA SPEC CULT: NORMAL
SPECIMEN SOURCE: NORMAL

## 2018-05-15 DIAGNOSIS — I82.522 CHRONIC DEEP VEIN THROMBOSIS (DVT) OF ILIAC VEIN OF LEFT LOWER EXTREMITY (H): ICD-10-CM

## 2018-05-15 LAB
ERYTHROCYTE [DISTWIDTH] IN BLOOD BY AUTOMATED COUNT: 12.7 % (ref 10–15)
ERYTHROCYTE [SEDIMENTATION RATE] IN BLOOD BY WESTERGREN METHOD: 4 MM/H (ref 0–15)
HCT VFR BLD AUTO: 41 % (ref 40–53)
HGB BLD-MCNC: 14.1 G/DL (ref 13.3–17.7)
MCH RBC QN AUTO: 30.7 PG (ref 26.5–33)
MCHC RBC AUTO-ENTMCNC: 34.4 G/DL (ref 31.5–36.5)
MCV RBC AUTO: 89 FL (ref 78–100)
PLATELET # BLD AUTO: 244 10E9/L (ref 150–450)
RBC # BLD AUTO: 4.59 10E12/L (ref 4.4–5.9)
WBC # BLD AUTO: 7.4 10E9/L (ref 4–11)

## 2018-05-15 PROCEDURE — 85730 THROMBOPLASTIN TIME PARTIAL: CPT | Mod: ZL | Performed by: SURGERY

## 2018-05-15 PROCEDURE — 85303 CLOT INHIBIT PROT C ACTIVITY: CPT | Mod: ZL | Performed by: SURGERY

## 2018-05-15 PROCEDURE — 85300 ANTITHROMBIN III ACTIVITY: CPT | Mod: ZL | Performed by: SURGERY

## 2018-05-15 PROCEDURE — 85610 PROTHROMBIN TIME: CPT | Mod: ZL | Performed by: SURGERY

## 2018-05-15 PROCEDURE — 83695 ASSAY OF LIPOPROTEIN(A): CPT | Mod: ZL | Performed by: SURGERY

## 2018-05-15 PROCEDURE — 86038 ANTINUCLEAR ANTIBODIES: CPT | Mod: ZL | Performed by: SURGERY

## 2018-05-15 PROCEDURE — 86147 CARDIOLIPIN ANTIBODY EA IG: CPT | Mod: ZL | Performed by: SURGERY

## 2018-05-15 PROCEDURE — 85027 COMPLETE CBC AUTOMATED: CPT | Mod: ZL | Performed by: SURGERY

## 2018-05-15 PROCEDURE — 81241 F5 GENE: CPT | Mod: ZL,GZ | Performed by: SURGERY

## 2018-05-15 PROCEDURE — 83090 ASSAY OF HOMOCYSTEINE: CPT | Mod: ZL | Performed by: SURGERY

## 2018-05-15 PROCEDURE — 36415 COLL VENOUS BLD VENIPUNCTURE: CPT | Mod: ZL | Performed by: SURGERY

## 2018-05-15 PROCEDURE — 86431 RHEUMATOID FACTOR QUANT: CPT | Mod: ZL | Performed by: SURGERY

## 2018-05-15 PROCEDURE — 85306 CLOT INHIBIT PROT S FREE: CPT | Mod: ZL | Performed by: SURGERY

## 2018-05-15 PROCEDURE — 85652 RBC SED RATE AUTOMATED: CPT | Mod: ZL | Performed by: SURGERY

## 2018-05-16 LAB
APTT PPP: 26 SEC (ref 24–37)
INR PPP: 1 (ref 0.8–1.2)

## 2018-05-17 LAB
ANA SER QL IF: NEGATIVE
AT III ACT/NOR PPP CHRO: 103 % (ref 85–135)
CARDIOLIPIN ANTIBODY IGG: <1.6 GPL-U/ML (ref 0–19.9)
CARDIOLIPIN ANTIBODY IGM: 2.8 MPL-U/ML (ref 0–19.9)
LPA SERPL-MCNC: 5 MG/DL (ref 0–30)
RHEUMATOID FACT SER NEPH-ACNC: <20 IU/ML (ref 0–20)

## 2018-05-18 LAB
PROT C ACT/NOR PPP CHRO: 117 % (ref 70–170)
PROT S FREE AG ACT/NOR PPP IA: 127 % (ref 70–148)

## 2018-05-21 LAB — COPATH REPORT: NORMAL

## 2018-05-23 LAB — HCYS SERPL-SCNC: 8.4 UMOL/L (ref 4–12)

## 2018-06-19 ENCOUNTER — TELEPHONE (OUTPATIENT)
Dept: FAMILY MEDICINE | Facility: OTHER | Age: 43
End: 2018-06-19

## 2018-06-19 NOTE — TELEPHONE ENCOUNTER
Pt was last seen 5-2018 and taken off Xeralto which he was taking for a blood clot, appears to be from 4-2017, had surgery for. Had quite a bit of ;labs done by vascular doc in 5-2018, pt never got notified of results.  Calling now with complaint of same foot swelling and numb feeling.  Instructed to go to ER for evaluation.   penny is primary, however you saw him the most and during this period???

## 2018-06-28 ENCOUNTER — TELEPHONE (OUTPATIENT)
Dept: FAMILY MEDICINE | Facility: OTHER | Age: 43
End: 2018-06-28

## 2018-06-28 NOTE — TELEPHONE ENCOUNTER
Lab work was ordered by and results were sent to Dr. Capellan's office.  I would recommend contacting them for final say regarding Xarelto.

## 2018-06-28 NOTE — TELEPHONE ENCOUNTER
7:36 AM    Reason for Call: Phone Call    Description: Please call Loretta, she is looking for Hernando lab results and is wondering if he needs to continue meds.     Was an appointment offered for this call?   No    Preferred method for responding to this message:   [phone]    If we cannot reach you directly, may we leave a detailed response at the number you provided    Yes      Yasmine Corrigan

## 2018-06-28 NOTE — TELEPHONE ENCOUNTER
Mother called and is wondering about the lab work from 5.18.18.She is wondering if patient should continue taking Xarelto. Please advise.

## 2018-06-29 ENCOUNTER — TELEPHONE (OUTPATIENT)
Dept: FAMILY MEDICINE | Facility: OTHER | Age: 43
End: 2018-06-29

## 2018-06-29 DIAGNOSIS — I82.422 ACUTE DEEP VEIN THROMBOSIS (DVT) OF ILIAC VEIN OF LEFT LOWER EXTREMITY (H): Primary | ICD-10-CM

## 2018-06-29 NOTE — TELEPHONE ENCOUNTER
Reason for call:  REFERRAL   1. Concern: blood clots   2. Have you seen a provider for this concern? Yes  3. Who? [facility]   4. When? ongoing  5. What services are you requesting? Referral to [place] providers that relinquishes [facility]   Description: Mom called requesting a letter be drafted as Pt wants to see Hematologist/ocologist at [facility] not at [facility].  Was referred to a doc at [facility] but wants care at [facility].  Was an appointment offered for this a call? No  If Yes:  Appointment type                Date    Preferred method for responding to this message: Telephone Call  What is your phone number ?[phone]    If we cannot reach you directly, may we leave a detailed response at the number you provided? yes  Can this message wait until your PCP/Provider returns if not available today? Not applicable, provider is in today

## 2018-07-10 ENCOUNTER — ONCOLOGY VISIT (OUTPATIENT)
Dept: ONCOLOGY | Facility: OTHER | Age: 43
End: 2018-07-10
Attending: FAMILY MEDICINE
Payer: MEDICARE

## 2018-07-10 VITALS
TEMPERATURE: 98.9 F | RESPIRATION RATE: 18 BRPM | SYSTOLIC BLOOD PRESSURE: 100 MMHG | HEIGHT: 73 IN | OXYGEN SATURATION: 98 % | DIASTOLIC BLOOD PRESSURE: 60 MMHG | BODY MASS INDEX: 26.43 KG/M2 | WEIGHT: 199.4 LBS | HEART RATE: 71 BPM

## 2018-07-10 DIAGNOSIS — I82.422 ACUTE DEEP VEIN THROMBOSIS (DVT) OF ILIAC VEIN OF LEFT LOWER EXTREMITY (H): ICD-10-CM

## 2018-07-10 LAB
ALBUMIN SERPL-MCNC: 4.1 G/DL (ref 3.4–5)
ALP SERPL-CCNC: 92 U/L (ref 40–150)
ALT SERPL W P-5'-P-CCNC: 37 U/L (ref 0–70)
ANION GAP SERPL CALCULATED.3IONS-SCNC: 4 MMOL/L (ref 3–14)
AST SERPL W P-5'-P-CCNC: 16 U/L (ref 0–45)
BASOPHILS # BLD AUTO: 0 10E9/L (ref 0–0.2)
BASOPHILS NFR BLD AUTO: 0.4 %
BILIRUB SERPL-MCNC: 0.4 MG/DL (ref 0.2–1.3)
BUN SERPL-MCNC: 10 MG/DL (ref 7–30)
CALCIUM SERPL-MCNC: 8.6 MG/DL (ref 8.5–10.1)
CHLORIDE SERPL-SCNC: 107 MMOL/L (ref 94–109)
CO2 SERPL-SCNC: 30 MMOL/L (ref 20–32)
CREAT SERPL-MCNC: 0.8 MG/DL (ref 0.66–1.25)
D DIMER PPP DDU-MCNC: <200 NG/ML D-DU (ref 0–300)
DIFFERENTIAL METHOD BLD: NORMAL
EOSINOPHIL # BLD AUTO: 0.1 10E9/L (ref 0–0.7)
EOSINOPHIL NFR BLD AUTO: 1.3 %
ERYTHROCYTE [DISTWIDTH] IN BLOOD BY AUTOMATED COUNT: 12.3 % (ref 10–15)
GFR SERPL CREATININE-BSD FRML MDRD: >90 ML/MIN/1.7M2
GLUCOSE SERPL-MCNC: 91 MG/DL (ref 70–99)
HCT VFR BLD AUTO: 42.3 % (ref 40–53)
HGB BLD-MCNC: 14.6 G/DL (ref 13.3–17.7)
IMM GRANULOCYTES # BLD: 0 10E9/L (ref 0–0.4)
IMM GRANULOCYTES NFR BLD: 0.3 %
LDH SERPL L TO P-CCNC: 176 U/L (ref 85–227)
LYMPHOCYTES # BLD AUTO: 2 10E9/L (ref 0.8–5.3)
LYMPHOCYTES NFR BLD AUTO: 29.1 %
MCH RBC QN AUTO: 30.5 PG (ref 26.5–33)
MCHC RBC AUTO-ENTMCNC: 34.5 G/DL (ref 31.5–36.5)
MCV RBC AUTO: 89 FL (ref 78–100)
MONOCYTES # BLD AUTO: 0.6 10E9/L (ref 0–1.3)
MONOCYTES NFR BLD AUTO: 8.6 %
NEUTROPHILS # BLD AUTO: 4.2 10E9/L (ref 1.6–8.3)
NEUTROPHILS NFR BLD AUTO: 60.3 %
NRBC # BLD AUTO: 0 10*3/UL
NRBC BLD AUTO-RTO: 0 /100
PLATELET # BLD AUTO: 271 10E9/L (ref 150–450)
POTASSIUM SERPL-SCNC: 4 MMOL/L (ref 3.4–5.3)
PROT SERPL-MCNC: 7.4 G/DL (ref 6.8–8.8)
RBC # BLD AUTO: 4.78 10E12/L (ref 4.4–5.9)
SODIUM SERPL-SCNC: 141 MMOL/L (ref 133–144)
WBC # BLD AUTO: 6.9 10E9/L (ref 4–11)

## 2018-07-10 PROCEDURE — 83615 LACTATE (LD) (LDH) ENZYME: CPT | Mod: ZL | Performed by: INTERNAL MEDICINE

## 2018-07-10 PROCEDURE — G0463 HOSPITAL OUTPT CLINIC VISIT: HCPCS

## 2018-07-10 PROCEDURE — 86146 BETA-2 GLYCOPROTEIN ANTIBODY: CPT | Mod: ZL | Performed by: INTERNAL MEDICINE

## 2018-07-10 PROCEDURE — 99204 OFFICE O/P NEW MOD 45 MIN: CPT | Performed by: INTERNAL MEDICINE

## 2018-07-10 PROCEDURE — 36415 COLL VENOUS BLD VENIPUNCTURE: CPT | Mod: ZL | Performed by: INTERNAL MEDICINE

## 2018-07-10 PROCEDURE — 81240 F2 GENE: CPT | Mod: ZL | Performed by: INTERNAL MEDICINE

## 2018-07-10 PROCEDURE — 85025 COMPLETE CBC W/AUTO DIFF WBC: CPT | Mod: ZL | Performed by: INTERNAL MEDICINE

## 2018-07-10 PROCEDURE — 81291 MTHFR GENE: CPT | Mod: ZL | Performed by: INTERNAL MEDICINE

## 2018-07-10 PROCEDURE — 85379 FIBRIN DEGRADATION QUANT: CPT | Mod: ZL | Performed by: INTERNAL MEDICINE

## 2018-07-10 PROCEDURE — 80053 COMPREHEN METABOLIC PANEL: CPT | Mod: ZL | Performed by: INTERNAL MEDICINE

## 2018-07-10 RX ORDER — RIVAROXABAN 20 MG/1
TABLET, FILM COATED ORAL
Refills: 3 | COMMUNITY
Start: 2018-06-19

## 2018-07-10 ASSESSMENT — PAIN SCALES - GENERAL: PAINLEVEL: NO PAIN (0)

## 2018-07-10 NOTE — PROGRESS NOTES
Visit Date:   07/10/2018      HEMATOLOGY ONCOLOGY CONSULTATION      REASON FOR CONSULTATION:  Chronic left lower extremity DVT.      REQUESTING PHYSICIAN:  Dr. Feldman.      HISTORY OF PRESENT ILLNESS:  Mr. Irizarry is a 42-year-old white male who apparently had developed an ankle injury while slipping on the ice and was seen by his primary care provider subsequently after a brace was placed.  He was noted to have left lower extremity swelling and subsequently a left lower extremity venous Doppler revealed extensive venous thrombosis in the left lower extremity.  The patient was sent immediately to the emergency room and was seen by Chepe Cuevas PA-C, who noted that the patient had extensive clot burden with a large VTE, not adherent to the wall and high risk for PE or CVA.  The patient was transferred to [facility] Vascular Department for immediate thrombectomy to be performed by Dr. Capellan.  He was placed on IV heparin and air transported to [facility].  This was on 04/06/2017.  The patient was noted to have a developmental disability and was treated with an endovascular left iliac vein mechanical thrombectomy and cavogram.  He was discharged on Xarelto 15 mg p.o. b.i.d. x 21 days and then 20 mg daily thereafter.  The patient subsequently was monitored on Xarelto.  He did have a repeat venous Doppler subsequently in 04/2017, which revealed no progression and the clot was stable.  The plan was to continue Xarelto and for repeat followup DVT duplex in 6 weeks.  He had a repeat venous Doppler of the left lower extremity on 07/03/2017.  There was no significant change in the extensive venous thrombosis of left lower extremity.  The patient again was continued on Xarelto and was being followed by Dr. Capellan and the plan was to continue Xarelto for a total of 1 year and then have hypercoagulability workup done after stopping Xarelto.  Apparently, this was done by Dr. Capellan and labs were drawn on 05/15/2018.   Antithrombin III level was normal.  He ordered Lipoprotein(a) that was normal.  He ordered protein C (Chromogenic), which was normal.  Protein S was normal.  Homocysteine was normal.  Anticardiolipin antibodies were normal.  Factor V Leiden revealed that he was heterozygous for the factor V Leiden gene, which put him at increased risk for developing thrombosis.  He did not order a prothrombin 2 mutation or MTHFR genotype or beta-2 glycoprotein.  According to the patient, most recently the patient had been participating in [employer] and may have developed some left ankle swelling and according to the patient was told by Dr. Feldman to go back on the Xarelto, so he has been back on the Xarelto and is here now for further evaluation.  He says he gets some left calf swelling, but he is fairly active with his track and field sports and basketball.  He is a very avid participant in [employer].  He denies any shortness of breath, abdominal pain, fevers, night sweats or weight loss.      PAST MEDICAL HISTORY:  As above, he has a developmental delay/learning disability.  He has factor V Leiden heterozygous state.      ALLERGIES:  HE IS ALLERGIC TO BEE VENOM.      MEDICATIONS:  Currently include Flonase nasal spray 2 sprays into both nostrils, Xarelto 20 mg daily (this was started on 06/19/2018).      SOCIAL HISTORY:  Tobacco is negative.  Alcohol is negative.  The patient lives with his mother and twin brother.      FAMILY HISTORY:  Noncontributory.      REVIEW OF SYSTEMS:   CENTRAL NERVOUS SYSTEM:  Negative for headaches, change in mental status.   RESPIRATORY:  Negative for shortness of breath, hemoptysis, cough.   CARDIAC:  Negative for chest pain, palpitations.   GASTROINTESTINAL:  Negative for change in bowel habits.  He may have mild constipation.  No bright blood per rectum.   MUSCULOSKELETAL:  Unremarkable except for some left ankle discomfort.   GENITOURINARY:  Negative.   CONSTITUTIONAL:  Negative  for fevers, night sweats, weight loss.      PHYSICAL EXAMINATION:   GENERAL:  He is a middle-aged white male in no acute distress.   VITAL SIGNS:  Blood pressure 100/60, pulse 71, respirations 18, temp 98.9.   HEENT:  Atraumatic, normocephalic.  Oropharynx is nonerythematous.   NECK:  Supple.   LUNGS:  Clear to auscultation and percussion.   HEART:  Regular rhythm, S1, S2 normal.   ABDOMEN:  Soft, normoactive bowel sounds.  No mass, nontender.   EXTREMITIES:  Left lower extremity is mildly swollen, nontender.  Negative Homans' sign.   NEUROLOGIC:  Grossly nonfocal.      LABORATORY DATA:  D-dimer is less than 200, CBC is within normal limits, BUN 10, creatinine 0.8, LDH is 176.      IMPRESSION:  History of acute left lower extremity deep vein thrombosis, status post thrombectomy in 2017, on chronic Xarelto for 1 year.  The patient was taken off Xarelto and was found to have factor V Leiden heterozygous state, now has resumed Xarelto.  The patient does have a hypercoagulable state.  Nonetheless, we want to complete the hypercoagulability workup by obtaining prothrombin 2 mutation, beta-2 glycoprotein antibodies and MTHFR genotype.  Also, we would like to obtain a venous Doppler of the left lower extremity to assess the presence of a deep vein thrombosis.  Otherwise, once the above workup is negative, the patient will likely need to be on lifelong Xarelto given the fact he has a hypercoagulable state.  Nonetheless, we will wait for the final workup.      Seventy minutes were spent with the patient ad greater than half the time was spent on counseling and coordination of care.         ALEXANDRIA DYER MD             D: 07/10/2018   T: 07/10/2018   MT: JOSE      Name:     CAMRON ZAMORA   MRN:      [id number]        Account:      [id number]   :      1975           Visit Date:   07/10/2018      Document: [id number]       cc: Bobbi Alexandra MD

## 2018-07-10 NOTE — MR AVS SNAPSHOT
After Visit Summary   7/10/2018    Hernando Irizarry    MRN: [id number]           Patient Information     Date Of Birth          1975        Visit Information        Provider Department      7/10/2018 11:00 AM Jessi England MD [facility]        Today's Diagnoses     Acute deep vein thrombosis (DVT) of iliac vein of left lower extremity (H)          Care Instructions    We would like to see you back in 2-3 weeks.  You have been scheduled for an ultrasound of your lower legs. We will discuss these results with you at your next appointment.    When you are in need of a refill of your medications, please call your pharmacy and they will send us the request. If you have any questions please call [phone]            Follow-ups after your visit        Your next 10 appointments already scheduled     Jul 24, 2018 11:00 AM CDT   US LOWER EXTREMITY VENOUS DUPLEX BILATERAL with HIUS1   HI ULTRASOUND ([facility] )    [address] [place] [address] [id number] [phone]           Please bring a list of your medicines (including vitamins, minerals and over-the-counter drugs). Also, tell your doctor about any allergies you may have. Wear comfortable clothes and leave your valuables at home.  You do not need to do anything special to prepare for your exam.  Please call the Imaging Department at your exam site with any questions.            Jul 30, 2018 11:30 AM CDT   (Arrive by 11:15 AM)   Return Visit with Jessi England MD   [facility] ([facility] )    [address] [facility] [id number] [phone]              Future tests that were ordered for you today     Open Future Orders        Priority Expected Expires Ordered    US Venous extremity lower bilateral Routine 7/11/2018 7/10/2019 7/10/2018            Who to contact     If you have questions or need follow up information about today's clinic visit or your schedule please contact  "[facility] [place] directly at [phone].  Normal or non-critical lab and imaging results will be communicated to you by MyChart, letter or phone within 4 business days after the clinic has received the results. If you do not hear from us within 7 days, please contact the clinic through [employer] or phone. If you have a critical or abnormal lab result, we will notify you by phone as soon as possible.  Submit refill requests through [employer] or call your pharmacy and they will forward the refill request to us. Please allow 3 business days for your refill to be completed.          Additional Information About Your Visit        [employer] - Addiction Treatment Reviews & Information Information     [employer] lets you send messages to your doctor, view your test results, renew your prescriptions, schedule appointments and more. To sign up, go to [email or web address] . Click on \"Log in\" on the left side of the screen, which will take you to the Welcome page. Then click on \"Sign up Now\" on the right side of the page.     You will be asked to enter the access code listed below, as well as some personal information. Please follow the directions to create your username and password.     Your access code is: [email or web address]  Expires: 2018  2:46 PM     Your access code will  in 90 days. If you need help or a new code, please call your [facility] clinic or [phone].        Care EveryWhere ID     This is your Care EveryWhere ID. This could be used by other organizations to access your [facility] medical records  [id number]        Your Vitals Were     Pulse Temperature Respirations Height Pulse Oximetry BMI (Body Mass Index)    71 98.9  F (37.2  C) (Tympanic) 18 1.854 m (6' 1\") 98% 26.31 kg/m2       Blood Pressure from Last 3 Encounters:   07/10/18 100/60   05/10/18 108/80   17 130/74    Weight from Last 3 Encounters:   07/10/18 90.4 kg (199 lb 6.4 oz)   05/10/18 87.1 kg (192 lb)   17 88 kg (194 lb)              We Performed the Following     Beta 2 " Glycoprotein 1 Antibody IgG     Beta 2 Glycoprotein 1 Antibody IgM     CBC with platelets differential     Comprehensive metabolic panel     D-Dimer (HI,GH)     F2 prothrombin 45426Q Mut Anal     Lactate Dehydrogenase     MTHFR genotype        Primary Care Provider Office Phone # Fax #    Bobbinilda Alexandra [phone] [address] [facility] [address]        Equal Access to Services     [facility] : Hadii aad ku hadasho Soomaali, waaxda luqadaha, qaybta kaalmada adeegyada, waxay idiin hayaan adeeg kharash laroxien . So [facility] [phone].    ATENCIÓN: Si habla español, tiene a peter disposición servicios gratuitos de asistencia lingüística. [facility] [phone].    We comply with applicable federal civil rights laws and [place] laws. We do not discriminate on the basis of race, color, national origin, age, disability, sex, sexual orientation, or gender identity.            Thank you!     Thank you for choosing [facility]  for your care. Our goal is always to provide you with excellent care. Hearing back from our patients is one way we can continue to improve our services. Please take a few minutes to complete the written survey that you may receive in the mail after your visit with us. Thank you!             Your Updated Medication List - Protect others around you: Learn how to safely use, store and throw away your medicines at www.disposemymeds.org.          This list is accurate as of 7/10/18 11:57 AM.  Always use your most recent med list.                   Brand Name Dispense Instructions for use Diagnosis    fluticasone 50 MCG/ACT spray    FLONASE    1 Bottle    Spray 1-2 sprays into both nostrils daily    Chronic non-seasonal allergic rhinitis, unspecified trigger       XARELTO 20 MG Tabs tablet   Generic drug:  rivaroxaban ANTICOAGULANT      TK 1 T PO QAM WITH BREAKFAST STARTING 04/29/17    Acute deep vein thrombosis (DVT) of iliac vein of left lower  extremity (H)

## 2018-07-10 NOTE — PATIENT INSTRUCTIONS
We would like to see you back in 2-3 weeks.  You have been scheduled for an ultrasound of your lower legs. We will discuss these results with you at your next appointment.    When you are in need of a refill of your medications, please call your pharmacy and they will send us the request. If you have any questions please call [phone]

## 2018-07-10 NOTE — NURSING NOTE
"Chief Complaint   Patient presents with     Consult     Consult for DVT       Initial /60  Pulse 71  Temp 98.9  F (37.2  C) (Tympanic)  Resp 18  Ht 1.854 m (6' 1\")  Wt 90.4 kg (199 lb 6.4 oz)  SpO2 98%  BMI 26.31 kg/m2 Estimated body mass index is 26.31 kg/(m^2) as calculated from the following:    Height as of this encounter: 1.854 m (6' 1\").    Weight as of this encounter: 90.4 kg (199 lb 6.4 oz).  Medication Reconciliation: complete   Immunizations up to date, Declines advanced directives, pain = 0, PHQ 9 = 0.    Angelica Rodriguez LPN    "

## 2018-07-11 ASSESSMENT — PATIENT HEALTH QUESTIONNAIRE - PHQ9: SUM OF ALL RESPONSES TO PHQ QUESTIONS 1-9: 0

## 2018-07-12 LAB
B2 GLYCOPROT1 IGG SERPL IA-ACNC: <0.6 U/ML
B2 GLYCOPROT1 IGM SERPL IA-ACNC: <0.9 U/ML
COPATH REPORT: NORMAL

## 2018-07-13 LAB — COPATH REPORT: NORMAL

## 2018-07-26 ENCOUNTER — HOSPITAL ENCOUNTER (OUTPATIENT)
Dept: ULTRASOUND IMAGING | Facility: HOSPITAL | Age: 43
Discharge: HOME | End: 2018-07-26
Attending: INTERNAL MEDICINE | Admitting: INTERNAL MEDICINE
Payer: MEDICARE

## 2018-07-26 DIAGNOSIS — I82.422 ACUTE DEEP VEIN THROMBOSIS (DVT) OF ILIAC VEIN OF LEFT LOWER EXTREMITY (H): ICD-10-CM

## 2018-07-26 PROCEDURE — 93970 EXTREMITY STUDY: CPT | Mod: TC

## 2018-07-30 ENCOUNTER — ONCOLOGY VISIT (OUTPATIENT)
Dept: ONCOLOGY | Facility: OTHER | Age: 43
End: 2018-07-30
Attending: INTERNAL MEDICINE
Payer: MEDICARE

## 2018-07-30 VITALS
SYSTOLIC BLOOD PRESSURE: 120 MMHG | DIASTOLIC BLOOD PRESSURE: 70 MMHG | HEART RATE: 82 BPM | TEMPERATURE: 98.9 F | OXYGEN SATURATION: 99 % | HEIGHT: 73 IN | RESPIRATION RATE: 18 BRPM | WEIGHT: 201 LBS | BODY MASS INDEX: 26.64 KG/M2

## 2018-07-30 DIAGNOSIS — I82.422 ACUTE DEEP VEIN THROMBOSIS (DVT) OF ILIAC VEIN OF LEFT LOWER EXTREMITY (H): Primary | ICD-10-CM

## 2018-07-30 PROCEDURE — 99213 OFFICE O/P EST LOW 20 MIN: CPT | Performed by: INTERNAL MEDICINE

## 2018-07-30 PROCEDURE — G0463 HOSPITAL OUTPT CLINIC VISIT: HCPCS

## 2018-07-30 ASSESSMENT — PAIN SCALES - GENERAL: PAINLEVEL: NO PAIN (0)

## 2018-07-30 NOTE — NURSING NOTE
"Chief Complaint   Patient presents with     RECHECK     Follow up Acute DVT/ hypercoaguluble state       Initial /70  Pulse 82  Temp 98.9  F (37.2  C) (Tympanic)  Resp 18  Ht 1.854 m (6' 1\")  Wt 91.2 kg (201 lb)  SpO2 99%  BMI 26.52 kg/m2 Estimated body mass index is 26.52 kg/(m^2) as calculated from the following:    Height as of this encounter: 1.854 m (6' 1\").    Weight as of this encounter: 91.2 kg (201 lb).  Medication Reconciliation: complete   Immunizations reviewed, declines advanced directives, pain = 0, PHQ9 = 0.    Angelica Rodriguez LPN    "

## 2018-07-30 NOTE — MR AVS SNAPSHOT
"              After Visit Summary   7/30/2018    Hernando Irizarry    MRN: [id number]           Patient Information     Date Of Birth          1975        Visit Information        Provider Department      7/30/2018 11:30 AM Jessi England MD [facility]        Today's Diagnoses     Acute deep vein thrombosis (DVT) of iliac vein of left lower extremity (H)    -  1       Follow-ups after your visit        Who to contact     If you have questions or need follow up information about today's clinic visit or your schedule please contact [facility] directly at [phone].  Normal or non-critical lab and imaging results will be communicated to you by MyChart, letter or phone within 4 business days after the clinic has received the results. If you do not hear from us within 7 days, please contact the clinic through MyChart or phone. If you have a critical or abnormal lab result, we will notify you by phone as soon as possible.  Submit refill requests through [employer] or call your pharmacy and they will forward the refill request to us. Please allow 3 business days for your refill to be completed.          Additional Information About Your Visit        Care EveryWhere ID     This is your Care EveryWhere ID. This could be used by other organizations to access your [facility] medical records  [id number]        Your Vitals Were     Pulse Temperature Respirations Height Pulse Oximetry BMI (Body Mass Index)    82 98.9  F (37.2  C) (Tympanic) 18 1.854 m (6' 1\") 99% 26.52 kg/m2       Blood Pressure from Last 3 Encounters:   07/30/18 120/70   07/10/18 100/60   05/10/18 108/80    Weight from Last 3 Encounters:   07/30/18 91.2 kg (201 lb)   07/10/18 90.4 kg (199 lb 6.4 oz)   05/10/18 87.1 kg (192 lb)              Today, you had the following     No orders found for display       Primary Care Provider Office Phone # Fax #    Bobbi Alexandra [email or web address] [facility] " [facility] [id number]        Equal Access to Services     [facility] : Hadii aad ku hadadrianryder Roth, wajereda luqadaha, qaybta quintonmabennett arceo. So [facility] [phone].    ATENCIÓN: Si habla español, tiene a peter disposición servicios gratuitos de asistencia lingüística. Reg[facility] al [phone].    We comply with applicable federal civil rights laws and [place] laws. We do not discriminate on the basis of race, color, national origin, age, disability, sex, sexual orientation, or gender identity.            Thank you!     Thank you for choosing [facility]  for your care. Our goal is always to provide you with excellent care. Hearing back from our patients is one way we can continue to improve our services. Please take a few minutes to complete the written survey that you may receive in the mail after your visit with us. Thank you!             Your Updated Medication List - Protect others around you: Learn how to safely use, store and throw away your medicines at www.disposemymeds.org.          This list is accurate as of 7/30/18  1:08 PM.  Always use your most recent med list.                   Brand Name Dispense Instructions for use Diagnosis    fluticasone 50 MCG/ACT spray    FLONASE    1 Bottle    Spray 1-2 sprays into both nostrils daily    Chronic non-seasonal allergic rhinitis, unspecified trigger       XARELTO 20 MG Tabs tablet   Generic drug:  rivaroxaban ANTICOAGULANT      TK 1 T PO QAM WITH BREAKFAST STARTING 04/29/17    Acute deep vein thrombosis (DVT) of iliac vein of left lower extremity (H)

## 2018-07-30 NOTE — PROGRESS NOTES
Visit Date:   07/30/2018      HISTORY OF PRESENT ILLNESS:  Mr. Irizarry returns for followup of chronic left lower extremity DVT.  We had seen the patient in consultation at the request of Dr. Feldman on 07/10/2018.  At that time, he was a 42-year-old white male who apparently had developed an ankle injury while slipping on the ice and was seen by his primary care provider.  Subsequently after a brace was placed, he was noted to have a left lower extremity swelling and subsequently a left lower extremity venous Doppler revealed extensive venous thrombosis of left lower extremity.  The patient was immediately to the emergency room and was seen by Chepe Cuevas PA-C, who noted that the patient had extensive clot burden with a large VTE noted adherent to the wall at high risk for PE or CVA.  The patient was transferred to [facility] vascular department for immediate thrombectomy performed by Dr. Capellan.  He was placed on IV heparin and air-transported to [facility].  This was on 04/06/2017.  The patient was noted to have a developmental disability and was treated with endovascular left iliac vein mechanical thrombectomy and cavogram.  He was discharged on Xarelto 15 mg p.o. b.i.d. for 21 days, then 20 mg daily thereafter.  The patient subsequently was monitored on Xarelto.  He did have a repeat venous Doppler subsequently in April 2017, which revealed no progression.  The clot was stable.  The plan was to continue Xarelto and have repeat followup DVT duplex in 6 weeks.  He had a repeat venous Doppler of the left lower extremity on 07/03/2017.  There was no significant change in extensive venous thrombosis of the left lower extremity.  The patient was, again, continued on Xarelto, he was being followed by Dr. Capellan.  The plan was to continue Xarelto for a total of 1 year and have hypocoagulability workup done after stopping Xarelto.  Apparently this was done by Dr. Capellan and labs were drawn on 05/15/2018.    Antithrombin III level was normal.  He ordered lipoprotein A that was normal.  He ordered protein C Chromogenic which was normal, Protein S was normal.  Homocysteine level was normal.  Anticardiolipin antibodies were normal.  Factor V Leiden revealed that he was heterozygous with factor V Leiden gene which put him at increased risk for developing thrombosis.  He did not order a prothrombin 2 mutation or MTHFR genotype or beta-2 glycoprotein.  According to the patient, most recently the patient had been participating in [employer] and may have developed some left ankle swelling.  According to the patient, he was told by Dr. Feldman to go back on Xarelto so he has been back on Xarelto.  When we saw him, he complained of some mild left calf swelling, but he is fairly active with his track and field sports and basketball.  He denied any symptoms otherwise.  When we saw the patient, we felt he would likely need to be on lifelong Xarelto, given that he was heterozygous for factor V Leiden mutation.  This would put him at increased risk for thrombosis.  We elected to complete the workup by obtaining an MTHFR genotype, prothrombin 2 mutation was negative, beta-2 glycoprotein antibodies were negative.  We also repeated the left lower extremity venous Doppler and this was otherwise no evidence of acute DVT.  There was a chronic thrombus of the left superficial vein with patent collateral.  The patient otherwise is doing well.  He continues play sports.  He continues to wear support hose.  He denies any shortness of breath, chest pain, left lower extremity pain, fevers, night sweats, weight loss.      PHYSICAL EXAMINATION:   GENERAL:  He is a middle-aged white male in no acute distress.   VITAL SIGNS:  Blood pressure 120/70, pulse 82, respirations 18, temperature 98.9.   HEENT:  Atraumatic, normocephalic.  Oropharynx is nonerythematous.   NECK:  Supple.   LUNGS:  Clear to auscultation and percussion.   HEART:  Regular  rhythm, S1, S2 normal.   ABDOMEN:  Soft, normoactive bowel sounds.  No mass, nontender.   LYMPHATICS:  No cervical, supraclavicular, axillary or inguinal nodes.   EXTREMITIES:  Without tenderness, no edema.   NEUROLOGIC:  Grossly nonfocal.      LABORATORY DATA:  As above.  D-dimer is less than 200.      IMPRESSION:     1.  History of acute left lower extremity DVT status post thrombectomy in 2017, on chronic Xarelto for 1 year.  The patient was taken off Xarelto.  He was found to have Factor V Leiden heterozygous state, resumed Xarelto.  The patient does have a hypercoagulable state.  The plan is to continue lifelong Xarelto.  He will also need support hose.     2.  Left superficial femoral vein thrombosis.  The patient has adequate collaterals.  The plan is to continue elevation of the leg and Xarelto, as well as support hose.  The patient will return to clinic p.r.n.  He will follow up with his primary care physician, Dr. Feldman.        Forty minutes spent with the patient, greater than half the time spent in counseling and coordination of care.         ALEXANDRIA DYER MD             D: 2018   T: 2018   MT: JOE      Name:     CAMRON ZAMORA   MRN:      [id number]        Account:      [id number]   :      1975           Visit Date:   2018      Document: [id number]       cc: Bobbi Feldman MD

## 2018-07-31 ASSESSMENT — PATIENT HEALTH QUESTIONNAIRE - PHQ9: SUM OF ALL RESPONSES TO PHQ QUESTIONS 1-9: 0

## 2018-08-24 NOTE — TELEPHONE ENCOUNTER
We will need notes from [place] regarding ultrasounds and why they are being ordered.  The providers in [place] who are following this could also possibly transfer the orders to [place] or [place].   Acute knee arthritis of right knee.  Possible bacterial infection of knee,however cultures are negative  Lyme titer is negative, MRI non specific  Consider rheumatology input, we may be forced to treating patient for culture negative septic joint.  IV regimen x 3 weeks vs oral regimen to be decided, await final culture results.

## 2019-02-07 ENCOUNTER — OFFICE VISIT (OUTPATIENT)
Dept: FAMILY MEDICINE | Facility: OTHER | Age: 44
End: 2019-02-07
Attending: FAMILY MEDICINE
Payer: MEDICARE

## 2019-02-07 VITALS
SYSTOLIC BLOOD PRESSURE: 124 MMHG | HEIGHT: 73 IN | DIASTOLIC BLOOD PRESSURE: 74 MMHG | TEMPERATURE: 98.1 F | OXYGEN SATURATION: 97 % | BODY MASS INDEX: 26.64 KG/M2 | HEART RATE: 68 BPM | WEIGHT: 201 LBS | RESPIRATION RATE: 14 BRPM

## 2019-02-07 DIAGNOSIS — D68.51 FACTOR V LEIDEN MUTATION (H): Primary | ICD-10-CM

## 2019-02-07 LAB
ALBUMIN SERPL-MCNC: 4.1 G/DL (ref 3.4–5)
ALP SERPL-CCNC: 89 U/L (ref 40–150)
ALT SERPL W P-5'-P-CCNC: 40 U/L (ref 0–70)
ANION GAP SERPL CALCULATED.3IONS-SCNC: 4 MMOL/L (ref 3–14)
AST SERPL W P-5'-P-CCNC: 16 U/L (ref 0–45)
BILIRUB SERPL-MCNC: 0.3 MG/DL (ref 0.2–1.3)
BUN SERPL-MCNC: 16 MG/DL (ref 7–30)
CALCIUM SERPL-MCNC: 8.9 MG/DL (ref 8.5–10.1)
CHLORIDE SERPL-SCNC: 107 MMOL/L (ref 94–109)
CO2 SERPL-SCNC: 30 MMOL/L (ref 20–32)
CREAT SERPL-MCNC: 0.84 MG/DL (ref 0.66–1.25)
GFR SERPL CREATININE-BSD FRML MDRD: >90 ML/MIN/{1.73_M2}
GLUCOSE SERPL-MCNC: 113 MG/DL (ref 70–99)
POTASSIUM SERPL-SCNC: 3.7 MMOL/L (ref 3.4–5.3)
PROT SERPL-MCNC: 7.5 G/DL (ref 6.8–8.8)
SODIUM SERPL-SCNC: 141 MMOL/L (ref 133–144)

## 2019-02-07 PROCEDURE — 80053 COMPREHEN METABOLIC PANEL: CPT | Mod: ZL | Performed by: FAMILY MEDICINE

## 2019-02-07 PROCEDURE — G0463 HOSPITAL OUTPT CLINIC VISIT: HCPCS

## 2019-02-07 PROCEDURE — 99213 OFFICE O/P EST LOW 20 MIN: CPT | Performed by: FAMILY MEDICINE

## 2019-02-07 PROCEDURE — 36415 COLL VENOUS BLD VENIPUNCTURE: CPT | Mod: ZL | Performed by: FAMILY MEDICINE

## 2019-02-07 ASSESSMENT — PAIN SCALES - GENERAL: PAINLEVEL: NO PAIN (0)

## 2019-02-07 ASSESSMENT — MIFFLIN-ST. JEOR: SCORE: 1870.61

## 2019-02-07 NOTE — PROGRESS NOTES
SUBJECTIVE:   Hernando Irizarry is a 43 year old male who presents to clinic today for the following health issues:      Follow up blood clots      Duration: ongoing    Description (location/character/radiation): left leg blood clots    Intensity:  moderate    Accompanying signs and symptoms: none    History (similar episodes/previous evaluation): None    Precipitating or alleviating factors: None    Therapies tried and outcome: continues on xarelto    Heme/Onc notes reviewed, lifelong anticoagulation recommended.  Patient is wearing compression stockings, he is OK taking Xarelto, and he has no questions today.         Problem list and histories reviewed & adjusted, as indicated.  Additional history: as documented    Patient Active Problem List   Diagnosis     MR (mental retardation)     Past Surgical History:   Procedure Laterality Date     VASCULAR SURGERY  04/06/2017    blood clot removal from left lower leg. Krishna Andino.       Social History     Tobacco Use     Smoking status: Never Smoker     Smokeless tobacco: Never Used   Substance Use Topics     Alcohol use: No     Family History   Problem Relation Age of Onset     Hypertension Mother      Hyperlipidemia Mother      Asthma Mother      Diabetes No family hx of      Coronary Artery Disease No family hx of      Other Cancer No family hx of      Breast Cancer No family hx of      Cerebrovascular Disease No family hx of      Colon Cancer No family hx of      Prostate Cancer No family hx of      Thyroid Disease No family hx of      Genetic Disorder No family hx of      Anesthesia Reaction No family hx of          Current Outpatient Medications   Medication Sig Dispense Refill     XARELTO 20 MG TABS tablet TK 1 T PO QAM WITH BREAKFAST STARTING 04/29/17  3     Allergies   Allergen Reactions     Bee Venom      Honey Bee Venom Protein (Honey Bee).       Reviewed and updated as needed this visit by clinical staff  Tobacco  Allergies  Meds       Reviewed and updated  "as needed this visit by Provider         ROS:  Constitutional, HEENT, cardiovascular, pulmonary, gi and gu systems are negative, except as otherwise noted.    OBJECTIVE:     /74 (BP Location: Left arm, Patient Position: Sitting, Cuff Size: Adult Regular)   Pulse 68   Temp 98.1  F (36.7  C) (Tympanic)   Resp 14   Ht 1.854 m (6' 1\")   Wt 91.2 kg (201 lb)   SpO2 97%   BMI 26.52 kg/m    Body mass index is 26.52 kg/m .  GENERAL: healthy, alert and no distress  PSYCH: mentation appears normal, affect normal/bright    Diagnostic Test Results:  none     ASSESSMENT/PLAN:     1. Factor V Leiden mutation (H)  Labs updated.  Follow-up on annual basis,  - Comprehensive metabolic panel        Bobbi Alexandra MD  [facility]  "

## 2019-02-07 NOTE — NURSING NOTE
"Chief Complaint   Patient presents with     RECHECK     Blood clots       Initial /74 (BP Location: Left arm, Patient Position: Sitting, Cuff Size: Adult Regular)   Pulse 68   Temp 98.1  F (36.7  C) (Tympanic)   Resp 14   Ht 1.854 m (6' 1\")   Wt 91.2 kg (201 lb)   SpO2 97%   BMI 26.52 kg/m   Estimated body mass index is 26.52 kg/m  as calculated from the following:    Height as of this encounter: 1.854 m (6' 1\").    Weight as of this encounter: 91.2 kg (201 lb).  Medication Reconciliation: complete    Daysi Galindo LPN    "

## 2019-08-02 DIAGNOSIS — H91.93 DECREASED HEARING OF BOTH EARS: Primary | ICD-10-CM

## 2019-09-05 ENCOUNTER — OFFICE VISIT (OUTPATIENT)
Dept: AUDIOLOGY | Facility: OTHER | Age: 44
End: 2019-09-05
Attending: AUDIOLOGIST
Payer: MEDICARE

## 2019-09-05 DIAGNOSIS — H61.22 IMPACTED CERUMEN OF LEFT EAR: Primary | ICD-10-CM

## 2019-09-05 NOTE — PROGRESS NOTES
Background/Results:Patient is developmentally delayed adult with caregivers  scheduled for hearing evaluation.    Otoscopy shows cerumen impaction left canal. Right is cerumen-free.    Recommendation:Hearing evaluation pending cerumen management. Patient is referred to ENT. He nor his caregivers had further questions or concerns.    Cinthia Chambers M.S., [facility]  Audiologist #[id number]

## 2019-10-03 ENCOUNTER — OFFICE VISIT (OUTPATIENT)
Dept: OTOLARYNGOLOGY | Facility: OTHER | Age: 44
End: 2019-10-03
Attending: PHYSICIAN ASSISTANT
Payer: MEDICARE

## 2019-10-03 VITALS
HEART RATE: 60 BPM | HEIGHT: 73 IN | TEMPERATURE: 97.3 F | BODY MASS INDEX: 26.51 KG/M2 | OXYGEN SATURATION: 99 % | WEIGHT: 200 LBS | DIASTOLIC BLOOD PRESSURE: 80 MMHG | SYSTOLIC BLOOD PRESSURE: 110 MMHG

## 2019-10-03 DIAGNOSIS — H61.22 IMPACTED CERUMEN OF LEFT EAR: ICD-10-CM

## 2019-10-03 PROCEDURE — G0463 HOSPITAL OUTPT CLINIC VISIT: HCPCS

## 2019-10-03 PROCEDURE — 69210 REMOVE IMPACTED EAR WAX UNI: CPT

## 2019-10-03 PROCEDURE — 69210 REMOVE IMPACTED EAR WAX UNI: CPT | Performed by: PHYSICIAN ASSISTANT

## 2019-10-03 ASSESSMENT — PAIN SCALES - GENERAL: PAINLEVEL: NO PAIN (0)

## 2019-10-03 ASSESSMENT — MIFFLIN-ST. JEOR: SCORE: 1866.07

## 2019-10-03 NOTE — PATIENT INSTRUCTIONS
Ears were cleaned.   Return for audiogram/ hearing test.     Return for ear cleaning as needed.   May try Debrox (wax softener) as needed for wax build up.     Thank you for allowing Jackie Schmitz PA-C and our ENT team to participate in your care.  If your medications are too expensive, please give the nurse a call.  We can possibly change this medication.  If you have a scheduling or an appointment question please contact our Health Unit Coordinator at their direct line [phone].   ALL nursing questions or concerns can be directed to your ENT nurse at: [phone] [place]

## 2019-10-03 NOTE — PROGRESS NOTES
"Otolaryngology Consultation    Patient: Hernando Irizarry  : 1975    Chief Complaint   Patient presents with     Consult     NPT Ear cleaning  Per OSMIN Chambers       HPI:  Hernando Irizarry is a 43 year old male seen today for ear cleaning. He was seen on  for audiogram and was found to have cerumen impaction. He presents today for ear cleaning.     Kavon presents for ear cleaning. He did try cotton swabs, debrox without relief.   He does use hearing protection at work, and feels he has trouble.     Denies COM or otologic surgeries.   Denies otalgia, otorrhea  Denies worrisome tinnitus  Denies fluctuating hearing loss or tinnitus.   Denies vertigo or facial paraesthesia.     Current Outpatient Rx   Medication Sig Dispense Refill     XARELTO 20 MG TABS tablet TK 1 T PO QAM WITH BREAKFAST STARTING 17  3       Allergies: Bee venom     No past medical history on file.    Past Surgical History:   Procedure Laterality Date     VASCULAR SURGERY  2017    blood clot removal from left lower leg. Krishna Andino.       ENT family history reviewed    Social History     Tobacco Use     Smoking status: Never Smoker     Smokeless tobacco: Never Used   Substance Use Topics     Alcohol use: No     Drug use: No       Review of Systems  ROS: 10 point ROS neg other than the symptoms noted above in the HPI     Physical Exam  /80   Pulse 60   Temp 97.3  F (36.3  C) (Tympanic)   Ht 1.854 m (6' 1\")   Wt 90.7 kg (200 lb)   SpO2 99%   BMI 26.39 kg/m    General - The patient is well nourished and well developed, and appears to have good nutritional status.  Alert and oriented to person and place, answers questions and cooperates with examination appropriately.   Head and Face - Normocephalic and atraumatic, with no gross asymmetry noted.  The facial nerve is intact, with strong symmetric movements.  Voice and Breathing - The patient was breathing comfortably without the use of accessory muscles. There was no wheezing, " stridor, or stertor.  The patients voice was clear and strong, and had appropriate pitch and quality.  On examination of the ears, I found that the ear LEFT were completely impacted with dense cerumen.  Therefore, I positioned them in the examination chair in a semi-supine position, beginning with the right side.  I used the binocular surgical microscope to perform cerumen removal.  I began by using a cerumen loop to gently lift the edges of the cerumen mass away from the walls of the external canal.  Once I did this, I was able to suction away fragments of wax and debris using suction.  Once the mass was loose enough, the entire plug was pulled from the canal. Canal does have a bony wall overhang.  The tympanic membrane was intact, no sign of perforation or middle ear effusion.  Right canal clear. Bony canal wall overhang.   The tympanic membrane was intact, no sign of perforation or middle ear effusion.    Eyes - Extraocular movements intact, and the pupils were reactive to light.  Sclera were not icteric or injected, conjunctiva were pink and moist.  Mouth - Examination of the oral cavity showed pink, healthy oral mucosa. No lesions or ulcerations noted.  The tongue was mobile and midline, and the dentition were in good condition.    Throat - The walls of the oropharynx were smooth, pink, moist, symmetric, and had no lesions or ulcerations.  The tonsillar pillars and soft palate were symmetric.  The uvula was midline on elevation.    Neck - Normal midline excursion of the laryngotracheal complex during swallowing.  Full range of motion on passive movement.  Palpation of the occipital, submental, submandibular, internal jugular chain, and supraclavicular nodes did not demonstrate any abnormal lymph nodes or masses.  Palpation of the thyroid was soft and smooth, with no nodules or goiter appreciated.  The trachea was mobile and midline.  Nose - External contour is symmetric, no gross deflection or scars.  Nasal  mucosa is pink and moist with no abnormal mucus.      ASSESSMENT:    ICD-10-CM    1. Impacted cerumen of left ear H61.22            The ears were cleaned today.  The patient may return here as needed or with their primary physician.  Aural hygiene was discussed and brochure was given to the patient highlighting care of the ear canal.  Avoidance of Q-tips was reiterated.  The patient was told to avoid flushing the ear canal if there is a possibility of a hole or perforation in the tympanic membrane.  Dry ear precautions were also reviewed.  If there are any unresolved concerns regarding hearing loss an audiogram is recommended.      Jackie Schmitz PA-C  ENT  [facility] [place]  [phone]

## 2019-10-03 NOTE — NURSING NOTE
"Chief Complaint   Patient presents with     Consult     NPT Ear cleaning  Per G Reyes       Initial /80   Pulse 60   Temp 97.3  F (36.3  C) (Tympanic)   Ht 1.854 m (6' 1\")   Wt 90.7 kg (200 lb)   SpO2 99%   BMI 26.39 kg/m   Estimated body mass index is 26.39 kg/m  as calculated from the following:    Height as of this encounter: 1.854 m (6' 1\").    Weight as of this encounter: 90.7 kg (200 lb).  Medication Reconciliation: complete  Adilene Cartagena LPN  "

## 2019-10-03 NOTE — LETTER
"    10/3/2019         RE: Hernando Irizarry  [address] [place] [address]        Dear Colleague,    Thank you for referring your patient, Hernando Irizarry, to the [facility]. Please see a copy of my visit note below.    Otolaryngology Consultation    Patient: Hernando Irizarry  : 1975    Chief Complaint   Patient presents with     Consult     NPT Ear cleaning  Per OSMIN Chambers       HPI:  Hernando Irizarry is a 43 year old male seen today for ear cleaning. He was seen on  for audiogram and was found to have cerumen impaction. He presents today for ear cleaning.     Kavon presents for ear cleaning. He did try cotton swabs, debrox without relief.   He does use hearing protection at work, and feels he has trouble.     Denies COM or otologic surgeries.   Denies otalgia, otorrhea  Denies worrisome tinnitus  Denies fluctuating hearing loss or tinnitus.   Denies vertigo or facial paraesthesia.     Current Outpatient Rx   Medication Sig Dispense Refill     XARELTO 20 MG TABS tablet TK 1 T PO QAM WITH BREAKFAST STARTING 17  3       Allergies: Bee venom     No past medical history on file.    Past Surgical History:   Procedure Laterality Date     VASCULAR SURGERY  2017    blood clot removal from left lower leg. Krishna Andino.       ENT family history reviewed    Social History     Tobacco Use     Smoking status: Never Smoker     Smokeless tobacco: Never Used   Substance Use Topics     Alcohol use: No     Drug use: No       Review of Systems  ROS: 10 point ROS neg other than the symptoms noted above in the HPI     Physical Exam  /80   Pulse 60   Temp 97.3  F (36.3  C) (Tympanic)   Ht 1.854 m (6' 1\")   Wt 90.7 kg (200 lb)   SpO2 99%   BMI 26.39 kg/m     General - The patient is well nourished and well developed, and appears to have good nutritional status.  Alert and oriented to person and place, answers questions and cooperates with examination appropriately.   Head and Face - " Normocephalic and atraumatic, with no gross asymmetry noted.  The facial nerve is intact, with strong symmetric movements.  Voice and Breathing - The patient was breathing comfortably without the use of accessory muscles. There was no wheezing, stridor, or stertor.  The patients voice was clear and strong, and had appropriate pitch and quality.  On examination of the ears, I found that the ear LEFT were completely impacted with dense cerumen.  Therefore, I positioned them in the examination chair in a semi-supine position, beginning with the right side.  I used the binocular surgical microscope to perform cerumen removal.  I began by using a cerumen loop to gently lift the edges of the cerumen mass away from the walls of the external canal.  Once I did this, I was able to suction away fragments of wax and debris using suction.  Once the mass was loose enough, the entire plug was pulled from the canal. Canal does have a bony wall overhang.  The tympanic membrane was intact, no sign of perforation or middle ear effusion.  Right canal clear. Bony canal wall overhang.   The tympanic membrane was intact, no sign of perforation or middle ear effusion.    Eyes - Extraocular movements intact, and the pupils were reactive to light.  Sclera were not icteric or injected, conjunctiva were pink and moist.  Mouth - Examination of the oral cavity showed pink, healthy oral mucosa. No lesions or ulcerations noted.  The tongue was mobile and midline, and the dentition were in good condition.    Throat - The walls of the oropharynx were smooth, pink, moist, symmetric, and had no lesions or ulcerations.  The tonsillar pillars and soft palate were symmetric.  The uvula was midline on elevation.    Neck - Normal midline excursion of the laryngotracheal complex during swallowing.  Full range of motion on passive movement.  Palpation of the occipital, submental, submandibular, internal jugular chain, and supraclavicular nodes did not  demonstrate any abnormal lymph nodes or masses.  Palpation of the thyroid was soft and smooth, with no nodules or goiter appreciated.  The trachea was mobile and midline.  Nose - External contour is symmetric, no gross deflection or scars.  Nasal mucosa is pink and moist with no abnormal mucus.      ASSESSMENT:    ICD-10-CM    1. Impacted cerumen of left ear H61.22            The ears were cleaned today.  The patient may return here as needed or with their primary physician.  Aural hygiene was discussed and brochure was given to the patient highlighting care of the ear canal.  Avoidance of Q-tips was reiterated.  The patient was told to avoid flushing the ear canal if there is a possibility of a hole or perforation in the tympanic membrane.  Dry ear precautions were also reviewed.  If there are any unresolved concerns regarding hearing loss an audiogram is recommended.      Jackie Schmitz PA-C  ENT  [facility]  [phone]      Again, thank you for allowing me to participate in the care of your patient.        Sincerely,        Jackie Schmitz PA-C

## 2019-10-08 DIAGNOSIS — H91.93 DECREASED HEARING OF BOTH EARS: Primary | ICD-10-CM

## 2019-10-17 ENCOUNTER — OFFICE VISIT (OUTPATIENT)
Dept: AUDIOLOGY | Facility: OTHER | Age: 44
End: 2019-10-17
Attending: AUDIOLOGIST
Payer: MEDICARE

## 2019-10-17 DIAGNOSIS — H90.3 SENSORINEURAL HEARING LOSS (SNHL) OF BOTH EARS: Primary | ICD-10-CM

## 2019-10-17 PROCEDURE — 92550 TYMPANOMETRY & REFLEX THRESH: CPT | Performed by: AUDIOLOGIST

## 2019-10-17 PROCEDURE — 92557 COMPREHENSIVE HEARING TEST: CPT | Performed by: AUDIOLOGIST

## 2019-10-17 NOTE — PROGRESS NOTES
Audiology Evaluation Completed. Please refer SCANNED AUDIOGRAM and/or TYMPANOGRAM for BACKGROUND, RESULTS, RECOMMENDATIONS.      Cinthia YANG, [facility]-A  Audiologist #[id number]

## 2019-10-21 ENCOUNTER — TELEPHONE (OUTPATIENT)
Dept: OTOLARYNGOLOGY | Facility: OTHER | Age: 44
End: 2019-10-21

## 2019-10-21 NOTE — TELEPHONE ENCOUNTER
I spoke with pt's mother.  She states that pt will need hearing aids.  Does he need to have an mri or can we schedule the hearing aid consult?  Please advise.

## 2019-11-13 ENCOUNTER — OFFICE VISIT (OUTPATIENT)
Dept: AUDIOLOGY | Facility: OTHER | Age: 44
End: 2019-11-13
Attending: AUDIOLOGIST
Payer: MEDICARE

## 2019-11-13 DIAGNOSIS — H90.3 SENSORINEURAL HEARING LOSS (SNHL) OF BOTH EARS: Primary | ICD-10-CM

## 2019-11-13 PROCEDURE — 92591 ZZHC HEARING AID EXAM BINAURAL: CPT | Performed by: AUDIOLOGIST

## 2019-11-13 NOTE — PROGRESS NOTES
BACKGROUND:  Hernando was seen today for consult regarding hearing aids. The patient notes difficulty with communication in a variety of listening situations and would like to explore possible benefit obtained via use of amplification.      Patient has received medical clearance for hearing aid use from ELY Saenz.  Hernando is a binaural hearing aid candidate and will receive a Hearing Aid Recommendation today.    RESULTS:  Audiology Assistant reviewed below information:      Estimated insurance coverage for hearing aids reviewed.    [place] Department of Health form titled 'Legal rights and consumer information about purchasing a hearing instrument verbally reviewed and given to patient. Trial Period, cancellation fee, warranties highlighted. Patient risk factors have been provided to the patient in writing prior to the sale of the hearing aid per FDA regulation. The risk factors are also available in the User Instructional Booklet to be presented on the day of the hearing aid fitting.    ABN (Advanced Beneficiary Notice of Non-Coverage) completed and copy given to patient.       Hearing aid recommendation provided by Audiologist.    Thru use of hearing aids patient would like to improve ability to hear:    where there are groups and noise.     to hear the television at a lower volume to enjoy this activity with other people.     Hernando also reports trouble hearing in the car, at home, and on the telephone if there is not a volume control.      Discussed hearing aid styles, technology, features, and options at length with Hernando.  Sample devices were shown. Pros/Cons of options reviewed.  Expectations for amplification discussed. .Also discussed the importance of binaural amplification for hearing speech in the presence of background noise and localizing the directions of sounds.  Wireless options were also discussed at length and sample devices were shown.       Hearing Aid Recommendations: Hearing Aid  Recommendation reviewed with patient. Pt chose to proceed with order and Purchase Agreement completed. Copies provided to patient.      Hearing Aids:  Binaural Unitron Discover Moxi Jump 5/ [id number]  Color: dark brown  Battery Size: RECHARGEABLE 13  Earmold/Tips:  IN-THE-CANAL (EDVIN) moderate size 2 with closed domes large         RECOMMENDATIONS:  The 45 day trial period was explained to patient, and they expressed understanding. Mr. Irizarry signed the Hearing Aid Purchase Agreement and was given a copy, as well as details on his hearing aids. Patient was counseled that exact out of pocket amounts cannot be determined for hearing aid claims being sent to insurance. Any insurance coverage information presented to the patient is an estimate only, and is not a guarantee of payment. Patient has been advised to check with their own insurance.      Patient scheduled to return to clinic in 2 weeks for hearing aid fitting, programming and orientation.    Cinthia Chambers M.S.,[facility]-A  Audiologist #[id number]

## 2019-12-06 ENCOUNTER — OFFICE VISIT (OUTPATIENT)
Dept: AUDIOLOGY | Facility: OTHER | Age: 44
End: 2019-12-06
Attending: AUDIOLOGIST
Payer: MEDICARE

## 2019-12-06 DIAGNOSIS — H90.3 SENSORINEURAL HEARING LOSS (SNHL) OF BOTH EARS: Primary | ICD-10-CM

## 2019-12-06 PROCEDURE — V5020 CONFORMITY EVALUATION: HCPCS | Mod: LT | Performed by: AUDIOLOGIST

## 2019-12-06 PROCEDURE — V5160 DISPENSING FEE BINAURAL: HCPCS | Performed by: AUDIOLOGIST

## 2019-12-06 PROCEDURE — V5261 HEARING AID, DIGIT, BIN, BTE: HCPCS | Mod: NU | Performed by: AUDIOLOGIST

## 2019-12-06 PROCEDURE — 92593 ZZHC HEARING AID CHECK, BINAURAL: CPT | Performed by: AUDIOLOGIST

## 2019-12-06 PROCEDURE — V5011 HEARING AID FITTING/CHECKING: HCPCS | Mod: LT | Performed by: AUDIOLOGIST

## 2019-12-06 NOTE — PROGRESS NOTES
AUDIOLOGY REPORT    SUBJECTIVE: Hernando Irizarry, a 43 year old male, was seen in the Audiology Clinic at [facility] today for a Binaural hearing aid fitting. Previous results have revealed a bilateral  sensorineural hearing loss.     OBJECTIVE:  Prior to fitting, a hearing aid check was performed to ensure device functionality. The hearing aid conformity evaluation was completed.The hearing aids were placed and they provided a good fit. Real-ear-probe-microphone measurements were completed on the [employer] system and were a good match to NAL-NL2 target with soft sounds audible, moderate sounds comfortable, and loud sounds below discomfort. UCLs are verified through maximum power output measures and demonstrate appropriate limiting of loud inputs.     EAR(S) FIT:    MA HEARING AID MAKE: Right: Unitron  ; Left: Unitron    MA HEARING AID MODEL #: Right: Discover Moxi Jump 5  ; Left: Discover Moxi Jump 5  HEARING AID STYLE: Right: BTE  ; Left: BTE   DOME SIZE: Right: Discover medium vented closed with retention hook ; Left:: Discover medium vented closed with retention hook     LENGTH: Right: 2 Moderate   ; Left: 2 Moderate       SERIAL NUMBERS: Right: [id number] ; Left: [id number]   WARRANTY END DATE: Right: 12/19/2023  ; Left:: 12/19/2023     ASSESSMENT: Hearing aid fitting completed today. Verification measures were performed. Patient and/or caregiver demonstrated ability to insert and remove hearing aids and adjust volume toggle.    Cinthia Chambers M.S., [facility]-A  Audiologist #[id number]      HEARING AID ORIENTATION/AUDIOLOGY ASSISTANT      Mr. Irizarry was oriented to proper hearing aid use, care, cleaning (no water, dry brush), batteries (size 13 - Rechargeable, low-battery signal), aid insertion/removal, user booklet, warranty information, storage cases, and other hearing aid details. The patient confirmed understanding of hearing aid use and care, and showed proper insertion of hearing aid while in  the office today. Mr. Irizarry reported good volume and sound quality today.    Esperanza Raya  Audiology Assistant  [facility]  [phone]        PLAN: Mr. Irizarry will return for follow-up in 2-3 weeks for a hearing aid review appointment. Please call this clinic with questions regarding today s appointment.

## 2020-01-15 ENCOUNTER — TELEPHONE (OUTPATIENT)
Dept: FAMILY MEDICINE | Facility: OTHER | Age: 45
End: 2020-01-15

## 2020-01-15 NOTE — LETTER
[facility]  [address]  [place] [address]  Phone: [phone]    January 31, 2020        Hernando RANKIN Edie  [address] [place] [address]          To whom it may concern:    RE: Hernando Irizarry    Mr. Hernando Irizarry is capable of managing his own finances.    Please contact me for questions or concerns.      Sincerely,        Bobbi Alexandra MD

## 2020-01-15 NOTE — TELEPHONE ENCOUNTER
1:18 PM    Reason for Call: Phone Call    Description: Pts mother called and is requesting a call back regarding a prescription change and also a letter that they are needing for Social Security.    Was an appointment offered for this call? No  If yes : Appointment type              Date    Preferred method for responding to this message: Telephone Call  What is your phone number ?[phone]- [place] (mother)    If we cannot reach you directly, may we leave a detailed response at the number you provided? Yes    Can this message wait until your PCP/provider returns, if available today? Not applicable, pcp is in    [facility]

## 2020-01-15 NOTE — TELEPHONE ENCOUNTER
Pts mother called and needs a letter stating they are ok to take care of their own finances.  They want it in their names due to the parents getting up there and age and want them to have this come in their names.  Mother states they are already taking care of this themselves as is.